# Patient Record
Sex: MALE | Race: WHITE | NOT HISPANIC OR LATINO
[De-identification: names, ages, dates, MRNs, and addresses within clinical notes are randomized per-mention and may not be internally consistent; named-entity substitution may affect disease eponyms.]

---

## 2017-03-09 ENCOUNTER — APPOINTMENT (OUTPATIENT)
Dept: OPHTHALMOLOGY | Facility: CLINIC | Age: 77
End: 2017-03-09

## 2017-04-20 ENCOUNTER — APPOINTMENT (OUTPATIENT)
Dept: OPHTHALMOLOGY | Facility: CLINIC | Age: 77
End: 2017-04-20

## 2018-01-05 ENCOUNTER — APPOINTMENT (OUTPATIENT)
Dept: OPHTHALMOLOGY | Facility: CLINIC | Age: 78
End: 2018-01-05
Payer: MEDICARE

## 2018-01-05 PROCEDURE — 92014 COMPRE OPH EXAM EST PT 1/>: CPT

## 2018-04-19 ENCOUNTER — APPOINTMENT (OUTPATIENT)
Dept: OPHTHALMOLOGY | Facility: CLINIC | Age: 78
End: 2018-04-19
Payer: MEDICARE

## 2018-04-19 PROCEDURE — 92014 COMPRE OPH EXAM EST PT 1/>: CPT

## 2018-10-18 ENCOUNTER — APPOINTMENT (OUTPATIENT)
Dept: OPHTHALMOLOGY | Facility: CLINIC | Age: 78
End: 2018-10-18
Payer: MEDICARE

## 2018-10-18 PROCEDURE — 92226: CPT | Mod: 50

## 2018-10-18 PROCEDURE — 92014 COMPRE OPH EXAM EST PT 1/>: CPT

## 2019-03-01 ENCOUNTER — RESULT REVIEW (OUTPATIENT)
Age: 79
End: 2019-03-01

## 2019-03-01 ENCOUNTER — APPOINTMENT (OUTPATIENT)
Dept: HEMATOLOGY ONCOLOGY | Facility: CLINIC | Age: 79
End: 2019-03-01
Payer: MEDICARE

## 2019-03-01 VITALS
RESPIRATION RATE: 20 BRPM | HEART RATE: 63 BPM | HEIGHT: 73.03 IN | OXYGEN SATURATION: 95 % | SYSTOLIC BLOOD PRESSURE: 134 MMHG | BODY MASS INDEX: 27.3 KG/M2 | WEIGHT: 206 LBS | DIASTOLIC BLOOD PRESSURE: 68 MMHG | TEMPERATURE: 98 F

## 2019-03-01 DIAGNOSIS — Z80.0 FAMILY HISTORY OF MALIGNANT NEOPLASM OF DIGESTIVE ORGANS: ICD-10-CM

## 2019-03-01 DIAGNOSIS — Z87.891 PERSONAL HISTORY OF NICOTINE DEPENDENCE: ICD-10-CM

## 2019-03-01 DIAGNOSIS — Z85.46 PERSONAL HISTORY OF MALIGNANT NEOPLASM OF PROSTATE: ICD-10-CM

## 2019-03-01 PROCEDURE — 99205 OFFICE O/P NEW HI 60 MIN: CPT

## 2019-03-01 NOTE — ASSESSMENT
[FreeTextEntry1] : Right LE DVT\par Likely unprovoked\par He reports that he had an extensive occlusive right LE DVT- started having symptoms again since stopping xarelto\par \par Advised to resume xarelto and complete 6 months\par Thrombophilia work up sent today\par Reports uptodate with cancer screening\par \par Follow up in a few weeks. No labs on follow up

## 2019-03-01 NOTE — PHYSICAL EXAM
[Ambulatory and capable of all self care but unable to carry out any work activities] : Status 2- Ambulatory and capable of all self care but unable to carry out any work activities. Up and about more than 50% of waking hours [Normal] : no peripheral adenopathy appreciated

## 2019-03-01 NOTE — HISTORY OF PRESENT ILLNESS
[de-identified] : Mr Young is a very pleasant 78 year old gentleman with HTN, HLD here to discuss management for her right LE DVT\par \par Around beginning of October- he felt as sharp pain righjt calf, no swelling, discomfort\par He went to a local physician - who did ultrasound- sent to [facility]- where he had repeat ultrasoud and was placed on xarelto \par Tolerated well\par \par He had a repeat ultrasound 1/31/19 which showed partially occlusive DVT of the right femoral and polpiteal veins with mild reduction of the venous return\par He was advised to stop xarelto 2/12/19\par \par He feels that past few days - he has started having the same pain right calf pain. No swelling\par \par \par He is retired. He goes to a [employer] program 5 days a week, swims 3 days a week\par He always sits cross legged, when not on a recliner\par Denies any long distance travel, surgeries, immobility prior to the event\par \par Has ho prostate cancer had radical prostatectomy [facility] ~ 5 years ago (9/2014).\par No DVT post surgery\par \par No personal or family ho DVT\par \par Colonoscopy (within last year) - Dr Lucien Aguillon - was normal

## 2019-03-01 NOTE — CONSULT LETTER
[Dear  ___] : Dear  [unfilled], [Consult Letter:] : I had the pleasure of evaluating your patient, [unfilled]. [Please see my note below.] : Please see my note below. [Consult Closing:] : Thank you very much for allowing me to participate in the care of this patient.  If you have any questions, please do not hesitate to contact me. [Sincerely,] : Sincerely, [FreeTextEntry3] : Rancho Jack MD, MPH\par Attending Physician\par Hematology Oncology\par [facility] [place]\par [facility]\par

## 2019-03-04 ENCOUNTER — RX RENEWAL (OUTPATIENT)
Age: 79
End: 2019-03-04

## 2019-03-15 ENCOUNTER — RECORD ABSTRACTING (OUTPATIENT)
Age: 79
End: 2019-03-15

## 2019-03-15 DIAGNOSIS — Z82.3 FAMILY HISTORY OF STROKE: ICD-10-CM

## 2019-03-15 DIAGNOSIS — Z87.19 PERSONAL HISTORY OF OTHER DISEASES OF THE DIGESTIVE SYSTEM: ICD-10-CM

## 2019-03-15 DIAGNOSIS — F10.21 ALCOHOL DEPENDENCE, IN REMISSION: ICD-10-CM

## 2019-03-20 ENCOUNTER — APPOINTMENT (OUTPATIENT)
Dept: HEMATOLOGY ONCOLOGY | Facility: CLINIC | Age: 79
End: 2019-03-20
Payer: MEDICARE

## 2019-03-20 VITALS
WEIGHT: 208 LBS | TEMPERATURE: 97.6 F | BODY MASS INDEX: 27.57 KG/M2 | SYSTOLIC BLOOD PRESSURE: 145 MMHG | RESPIRATION RATE: 18 BRPM | HEIGHT: 73.03 IN | HEART RATE: 65 BPM | DIASTOLIC BLOOD PRESSURE: 64 MMHG | OXYGEN SATURATION: 96 %

## 2019-03-20 PROCEDURE — 99214 OFFICE O/P EST MOD 30 MIN: CPT

## 2019-03-20 NOTE — RESULTS/DATA
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 However, the\par actual risk may be modified by synergistic interaction with other factors.\par Correlation with this patients clinical condition and/or with results of other\par relevant tests, and genetic counseling is suggested.\par \par Factor V leiden mutation, APS, protein S, AT3 def- negative\par

## 2019-03-20 NOTE — HISTORY OF PRESENT ILLNESS
[de-identified] : Mr Young is a very pleasant 78 year old gentleman with HTN, HLD here to discuss management for her right LE DVT\par \par Around beginning of October- he felt as sharp pain righjt calf, no swelling, discomfort\par He went to a local physician - who did ultrasound- sent to [facility]- where he had repeat ultrasoud and was placed on xarelto \par Tolerated well\par \par He had a repeat ultrasound 1/31/19 which showed partially occlusive DVT of the right femoral and polpiteal veins with mild reduction of the venous return\par He was advised to stop xarelto 2/12/19\par \par He feels that past few days - he has started having the same pain right calf pain. No swelling\par \par \par He is retired. 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

## 2019-03-20 NOTE — ASSESSMENT
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 CBC, CMP, D-Dimer\par

## 2019-03-20 NOTE — CONSULT LETTER
[Dear  ___] : Dear  [unfilled], [Please see my note below.] : Please see my note below. [Consult Closing:] : Thank you very much for allowing me to participate in the care of this patient.  If you have any questions, please do not hesitate to contact me. [Sincerely,] : Sincerely, [Courtesy Letter:] : I had the pleasure of seeing your patient, [unfilled], in my office today. [DrDariela  ___] : Dr. PRETTY [FreeTextEntry3] : Rancho Jack MD, MPH\par Attending Physician\par Hematology Oncology\par [facility] [place]\par [facility]\par

## 2019-04-19 ENCOUNTER — APPOINTMENT (OUTPATIENT)
Dept: OPHTHALMOLOGY | Facility: CLINIC | Age: 79
End: 2019-04-19
Payer: MEDICARE

## 2019-04-19 PROCEDURE — 92014 COMPRE OPH EXAM EST PT 1/>: CPT

## 2019-05-01 ENCOUNTER — APPOINTMENT (OUTPATIENT)
Dept: CARDIOLOGY | Facility: CLINIC | Age: 79
End: 2019-05-01
Payer: MEDICARE

## 2019-05-01 VITALS
WEIGHT: 205 LBS | DIASTOLIC BLOOD PRESSURE: 60 MMHG | HEART RATE: 60 BPM | HEIGHT: 73 IN | BODY MASS INDEX: 27.17 KG/M2 | SYSTOLIC BLOOD PRESSURE: 130 MMHG

## 2019-05-01 PROCEDURE — 99213 OFFICE O/P EST LOW 20 MIN: CPT | Mod: 25

## 2019-05-01 PROCEDURE — 93351 STRESS TTE COMPLETE: CPT

## 2019-05-01 RX ORDER — MIRTAZAPINE 7.5 MG/1
7.5 TABLET, FILM COATED ORAL
Refills: 0 | Status: DISCONTINUED | COMMUNITY
End: 2019-05-01

## 2019-05-01 RX ORDER — QUETIAPINE FUMARATE 50 MG/1
50 TABLET ORAL
Refills: 0 | Status: DISCONTINUED | COMMUNITY
End: 2019-05-01

## 2019-05-01 RX ORDER — QUETIAPINE 25 MG/1
25 TABLET, FILM COATED ORAL
Refills: 0 | Status: DISCONTINUED | COMMUNITY
End: 2019-05-01

## 2019-05-01 NOTE — REASON FOR VISIT
[FreeTextEntry1] : The patient comes for a cardiology followup and stress echocardiography. He complains of symptoms of shortness of breath on moderate exertion over the past several weeks. The patient has just gotten over a long bout with cough and phlegm production. During this time his regular exercise program has been curtailed.There have been no cardiac symptoms of chest pain or discomfort no palpitations dizziness or lightheadedness.

## 2019-05-01 NOTE — DISCUSSION/SUMMARY
[FreeTextEntry1] : Today's stress echo findings revealed normal LV function at rest and post exercise with no evidence of exercise-induced myocardial ischemia or significant arrhythmias. The exercise capacity is somewhat limited. The patient has difficulty completing Francis stage II. The findings are similar to last year's study but the exercise capacity is reduced compared to 2 years ago. I do not feel that the symptom was explainable on a cardiac basis. I felt the patient should gradually get back into his program of exercise and if symptoms persist to consider pulmonary evaluation. Current medications will be continued. The patient is at the end of his prescribed period of anticoagulation following a DVT study several months ago. I asked him to check with his hematologist as to whether he should be transitioned to aspirin. 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 Today's findings were discussed with the patient and his wife.

## 2019-05-01 NOTE — PHYSICAL EXAM
[General Appearance - Well Developed] : well developed [Normal Appearance] : normal appearance [Well Groomed] : well groomed [No Deformities] : no deformities [General Appearance - Well Nourished] : well nourished [General Appearance - In No Acute Distress] : no acute distress [Normal Conjunctiva] : the conjunctiva exhibited no abnormalities [Eyelids - No Xanthelasma] : the eyelids demonstrated no xanthelasmas [Normal Oral Mucosa] : normal oral mucosa [No Oral Pallor] : no oral pallor [No Oral Cyanosis] : no oral cyanosis [Normal Jugular Venous A Waves Present] : normal jugular venous A waves present [Normal Jugular Venous V Waves Present] : normal jugular venous V waves present [No Jugular Venous Gaspar A Waves] : no jugular venous gaspar A waves [Exaggerated Use Of Accessory Muscles For Inspiration] : no accessory muscle use [Respiration, Rhythm And Depth] : normal respiratory rhythm and effort [Auscultation Breath Sounds / Voice Sounds] : lungs were clear to auscultation bilaterally [Heart Rate And Rhythm] : heart rate and rhythm were normal [Heart Sounds] : normal S1 and S2 [Murmurs] : no murmurs present [Abdomen Soft] : soft [Abdomen Tenderness] : non-tender [Abdomen Mass (___ Cm)] : no abdominal mass palpated [Abnormal Walk] : normal gait [Gait - Sufficient For Exercise Testing] : the gait was sufficient for exercise testing [Nail Clubbing] : no clubbing of the fingernails [Cyanosis, Localized] : no localized cyanosis [Petechial Hemorrhages (___cm)] : no petechial hemorrhages [Skin Color & Pigmentation] : normal skin color and pigmentation [] : no rash [No Venous Stasis] : no venous stasis [Skin Lesions] : no skin lesions [No Skin Ulcers] : no skin ulcer [No Xanthoma] : no  xanthoma was observed [Oriented To Time, Place, And Person] : oriented to person, place, and time [Affect] : the affect was normal [Mood] : the mood was normal [No Anxiety] : not feeling anxious [FreeTextEntry1] : The patient was treated recently for severe depression including ECT. The symptoms of depression have cleared.

## 2019-05-02 RX ORDER — ATORVASTATIN CALCIUM 20 MG/1
20 TABLET, FILM COATED ORAL
Refills: 0 | Status: DISCONTINUED | COMMUNITY
End: 2019-05-02

## 2019-05-22 ENCOUNTER — APPOINTMENT (OUTPATIENT)
Dept: CARDIOLOGY | Facility: CLINIC | Age: 79
End: 2019-05-22

## 2019-05-24 ENCOUNTER — RESULT REVIEW (OUTPATIENT)
Age: 79
End: 2019-05-24

## 2019-05-24 ENCOUNTER — APPOINTMENT (OUTPATIENT)
Dept: HEMATOLOGY ONCOLOGY | Facility: CLINIC | Age: 79
End: 2019-05-24
Payer: MEDICARE

## 2019-05-24 VITALS
TEMPERATURE: 98.3 F | DIASTOLIC BLOOD PRESSURE: 56 MMHG | RESPIRATION RATE: 18 BRPM | HEIGHT: 73.03 IN | WEIGHT: 212 LBS | HEART RATE: 64 BPM | OXYGEN SATURATION: 96 % | BODY MASS INDEX: 28.1 KG/M2 | SYSTOLIC BLOOD PRESSURE: 138 MMHG

## 2019-05-24 PROCEDURE — 99215 OFFICE O/P EST HI 40 MIN: CPT

## 2019-05-24 NOTE — HISTORY OF PRESENT ILLNESS
[de-identified] : He is seen today for follow up\par \par STopped xarelto end of April after completing 6 months \par Around mid March he started having "very mild dyspnea on exertion"\par He saw his cardiologist- who cleared him\par Subsequently saw Dr Al Padgett ([facility])\par  [de-identified] : Mr Young is a very pleasant 78 year old gentleman with HTN, HLD here to discuss management for her right LE DVT\par \par Around beginning of October- he felt as sharp pain righjt calf, no swelling, discomfort\par He went to a local physician - who did ultrasound- sent to [facility]- where he had repeat ultrasoud and was placed on xarelto \par Tolerated well\par \par He had a repeat ultrasound 1/31/19 which showed partially occlusive DVT of the right femoral and polpiteal veins with mild reduction of the venous return\par He was advised to stop xarelto 2/12/19\par \par He feels that past few days - he has started having the same pain right calf pain. No swelling\par \par \par He is retired. He goes to a [employer] program 5 days a week, swims 3 days a week\par He always sits cross legged, when not on a recliner\par Denies any long distance travel, surgeries, immobility prior to the event\par \par Has ho prostate cancer had radical prostatectomy [facility] ~ 5 years ago (9/2014).\par No DVT post surgery\par \par No personal or family ho DVT\par \par Colonoscopy (within last year) - Dr Lucien Aguillon - was normal \par \par \par Prothrombin gene mutation:\par HETEROZYGOUS\par INTERPRETATION:\par This patient is heterozygous for the Prothrombin [id number] mutation, and therefore\par at an increased risk(up to 3 fold) for thrombotic complications. However, the\par actual risk may be modified by synergistic interaction with other factors.\par Correlation with this patients clinical condition and/or with results of other\par relevant tests, and genetic counseling is suggested.\par \par Factor V leiden mutation, APS, protein S, AT3 def- negative\par

## 2019-05-24 NOTE — ASSESSMENT
[FreeTextEntry1] : SOB on exertion\par Elevated D DImer\par CT Angiogram and LE doppler\par \par Right LE DVT\par Likely unprovoked\par He reports that he had an extensive occlusive right LE DVT- started having symptoms again since stopping xarelto\par \par Work up shows heterozygous prothrombin gene mutation\par Completed 6 months of AC (xarelto) in April 2019\par Also advised to inform blood relative about the findings and have them evaluated by their PCPs\par Keep uptodate with cancer screening\par \par Follow up in 3 months. CBC, CMP, D-Dimer\par

## 2019-05-24 NOTE — CONSULT LETTER
[Dear  ___] : Dear  [unfilled], [Courtesy Letter:] : I had the pleasure of seeing your patient, [unfilled], in my office today. [Please see my note below.] : Please see my note below. [Consult Closing:] : Thank you very much for allowing me to participate in the care of this patient.  If you have any questions, please do not hesitate to contact me. [Sincerely,] : Sincerely, [DrDariela  ___] : Dr. PRETTY [FreeTextEntry3] : Rancho Jack MD, MPH\par Attending Physician\par Hematology Oncology\par [facility] [place]\par [facility]\par

## 2019-06-05 ENCOUNTER — APPOINTMENT (OUTPATIENT)
Dept: HEMATOLOGY ONCOLOGY | Facility: CLINIC | Age: 79
End: 2019-06-05
Payer: MEDICARE

## 2019-06-05 ENCOUNTER — RESULT REVIEW (OUTPATIENT)
Age: 79
End: 2019-06-05

## 2019-06-05 VITALS
DIASTOLIC BLOOD PRESSURE: 55 MMHG | SYSTOLIC BLOOD PRESSURE: 130 MMHG | HEIGHT: 73.03 IN | HEART RATE: 60 BPM | RESPIRATION RATE: 20 BRPM | TEMPERATURE: 98.1 F | OXYGEN SATURATION: 96 %

## 2019-06-05 PROCEDURE — 99499A: CUSTOM | Mod: NC

## 2019-06-18 ENCOUNTER — APPOINTMENT (OUTPATIENT)
Dept: GASTROENTEROLOGY | Facility: CLINIC | Age: 79
End: 2019-06-18
Payer: MEDICARE

## 2019-06-18 VITALS
HEART RATE: 65 BPM | WEIGHT: 206 LBS | HEIGHT: 73 IN | SYSTOLIC BLOOD PRESSURE: 124 MMHG | DIASTOLIC BLOOD PRESSURE: 56 MMHG | BODY MASS INDEX: 27.3 KG/M2

## 2019-06-18 PROCEDURE — 99204 OFFICE O/P NEW MOD 45 MIN: CPT

## 2019-06-18 NOTE — ASSESSMENT
[FreeTextEntry1] : Will plan on an upper endoscopy for iron deficiency anemia.  Explained risks/benefits/alternatives including not limited to bleeding, infection, perforation, missed lesions, anesthesia complications.  Patient understands and agrees, all questions answered.\par \par If his EGD is negative, will need a small bowel video capsule study.\par \par Thank you for referring Mr. ZIMMER.  Please do not hesitate to call to further discuss his/her care.\par \par Note faxed to requesting MD.\par \par

## 2019-06-18 NOTE — HISTORY OF PRESENT ILLNESS
[FreeTextEntry1] : Mr. Young is a pleasant 79M h/o HTN, HLD, RLE DVT diagnosed 10/18 s/p Xarelto until April 2019 seen at the request of Dr. Jack for iron deficiency anemia.  Recently was worked up for shortness of breath, saw both a cardiologist and pulmonologist, no cardiac or pulmonary abnormalities found to explain symptoms, during his workup he was found to have iron deficiency anemia.\par \par He has a normal brown BM daily, no blood, no black stool.\par \par Had a colonoscopy with Dr. Lucien Aguillon within the past year, states he was told he had a good prep, no polyps or masses found.  Has never had an EGD or small bowel evaluation.\par \juliana Is receiving IV iron infusions via Dr. Jack.\par \par No heartburn, regurgitation, dysphagia/odynohpagia, weight loss.  No other complaints.

## 2019-06-18 NOTE — PHYSICAL EXAM
[Sclera] : the sclera and conjunctiva were normal [General Appearance - Alert] : alert [General Appearance - In No Acute Distress] : in no acute distress [] : no respiratory distress [Outer Ear] : the ears and nose were normal in appearance [Apical Impulse] : the apical impulse was normal [Abdomen Tenderness] : non-tender [Abnormal Walk] : normal gait [Abdomen Soft] : soft [Skin Color & Pigmentation] : normal skin color and pigmentation [Cranial Nerves] : cranial nerves 2-12 were intact [Oriented To Time, Place, And Person] : oriented to person, place, and time

## 2019-06-25 ENCOUNTER — RESULT REVIEW (OUTPATIENT)
Age: 79
End: 2019-06-25

## 2019-06-26 ENCOUNTER — APPOINTMENT (OUTPATIENT)
Dept: GASTROENTEROLOGY | Facility: HOSPITAL | Age: 79
End: 2019-06-26

## 2019-06-27 ENCOUNTER — APPOINTMENT (OUTPATIENT)
Dept: GASTROENTEROLOGY | Facility: CLINIC | Age: 79
End: 2019-06-27

## 2019-06-28 ENCOUNTER — TRANSCRIPTION ENCOUNTER (OUTPATIENT)
Age: 79
End: 2019-06-28

## 2019-07-16 ENCOUNTER — APPOINTMENT (OUTPATIENT)
Dept: GASTROENTEROLOGY | Facility: HOSPITAL | Age: 79
End: 2019-07-16

## 2019-07-18 ENCOUNTER — APPOINTMENT (OUTPATIENT)
Dept: HEMATOLOGY ONCOLOGY | Facility: CLINIC | Age: 79
End: 2019-07-18
Payer: MEDICARE

## 2019-07-18 ENCOUNTER — RESULT REVIEW (OUTPATIENT)
Age: 79
End: 2019-07-18

## 2019-07-18 VITALS
WEIGHT: 204 LBS | TEMPERATURE: 95 F | HEIGHT: 73 IN | SYSTOLIC BLOOD PRESSURE: 151 MMHG | DIASTOLIC BLOOD PRESSURE: 69 MMHG | OXYGEN SATURATION: 95 % | RESPIRATION RATE: 16 BRPM | BODY MASS INDEX: 27.04 KG/M2 | HEART RATE: 55 BPM

## 2019-07-18 PROCEDURE — 99214 OFFICE O/P EST MOD 30 MIN: CPT

## 2019-07-18 NOTE — HISTORY OF PRESENT ILLNESS
[de-identified] : Mr Young is a very pleasant 78 year old gentleman with HTN, HLD here to discuss management for her right LE DVT\par \par Around beginning of October- he felt as sharp pain righjt calf, no swelling, discomfort\par He went to a local physician - who did ultrasound- sent to [facility]- where he had repeat ultrasoud and was placed on xarelto \par Tolerated well\par \par He had a repeat ultrasound 1/31/19 which showed partially occlusive DVT of the right femoral and polpiteal veins with mild reduction of the venous return\par He was advised to stop xarelto 2/12/19\par \par He feels that past few days - he has started having the same pain right calf pain. No swelling\par \par \par He is retired. 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, swims 3 days a week\par He always sits cross legged, when not on a recliner\par Denies any long distance travel, surgeries, immobility prior to the event\par \par Has ho prostate cancer had radical prostatectomy [facility] ~ 5 years ago (9/2014).\par No DVT post surgery\par \par No personal or family ho DVT\par \par Colonoscopy (within last year) - Dr Lucien Aguillon - was normal \par \par \par Prothrombin gene mutation:\par HETEROZYGOUS\par INTERPRETATION:\par This patient is heterozygous for the Prothrombin [id number] mutation, and therefore\par at an increased risk(up to 3 fold) for thrombotic complications. However, the\par actual risk may be modified by synergistic interaction with other factors.\par Correlation with this patients clinical condition and/or with results of other\par relevant tests, and genetic counseling is suggested.\par \par Factor V leiden mutation, APS, protein S, AT3 def- negative\par  [de-identified] : He is seen today for follow up\par \par He had worsening sob on exertion- work up showed ISMAEL and drop in Hgb\par s/p IV injectafer 750 mg x 1\par Feels significantly better, no further sob on exertion\par \par Hgb (7/3/19 with Dr Aguillon - 13\par \par Also saw Dr Martinez s/p EGD\par EGD:\par 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 Gastric polyp s/p biopsy\par 3. Irregular Z-line s/p biopsy\par

## 2019-07-18 NOTE — CONSULT LETTER
[Dear  ___] : Dear  [unfilled], [Courtesy Letter:] : I had the pleasure of seeing your patient, [unfilled], in my office today. [Please see my note below.] : Please see my note below. [Consult Closing:] : Thank you very much for allowing me to participate in the care of this patient.  If you have any questions, please do not hesitate to contact me. [Sincerely,] : Sincerely, [DrDariela  ___] : Dr. PRETTY [FreeTextEntry3] : Rancho Jack MD, MPH\par Attending Physician\par Hematology Oncology\par [facility] [place]\par [facility]\par

## 2019-07-18 NOTE — ASSESSMENT
[FreeTextEntry1] : Right LE DVT\par Likely unprovoked\par He reports that he had an extensive occlusive right LE DVT- started having symptoms again since stopping xarelto\par Work up shows heterozygous prothrombin gene mutation\par Completed 6 months of AC (xarelto) in April 2019\par Had rising D Dimer- CT angio negative for PE, ultrasound doppler showed old DVT\par D Dimer today pending\par Monitor symptoms for now\par May need long term anticoagulation in event for another DVT\par Also advised to inform blood relative about the findings and have them evaluated by their PCPs\par Keep uptodate with cancer screening\par \par Iron deficiency anemia\par Hgb and symptoms improved with IV Injectafer x 1\par Had EGD and capsule endoscopy with Dr Martinez\par EGD showed 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 CBC, CMP, D-Dimer, ferritin\par

## 2019-09-06 ENCOUNTER — APPOINTMENT (OUTPATIENT)
Dept: HEMATOLOGY ONCOLOGY | Facility: CLINIC | Age: 79
End: 2019-09-06
Payer: MEDICARE

## 2019-09-06 ENCOUNTER — RESULT REVIEW (OUTPATIENT)
Age: 79
End: 2019-09-06

## 2019-09-06 VITALS
SYSTOLIC BLOOD PRESSURE: 143 MMHG | BODY MASS INDEX: 27.04 KG/M2 | TEMPERATURE: 98.5 F | OXYGEN SATURATION: 94 % | HEART RATE: 58 BPM | DIASTOLIC BLOOD PRESSURE: 65 MMHG | WEIGHT: 204 LBS | RESPIRATION RATE: 20 BRPM | HEIGHT: 72.99 IN

## 2019-09-06 PROCEDURE — 99215 OFFICE O/P EST HI 40 MIN: CPT

## 2019-09-06 NOTE — ASSESSMENT
[FreeTextEntry1] : Right LE DVT\par Likely unprovoked\par He reports that he had an extensive occlusive right LE DVT- started having symptoms again since stopping xarelto\par Work up shows heterozygous prothrombin gene mutation\par Completed 6 months of AC (xarelto) in April 2019\par Had rising D Dimer- CT angio negative for PE, ultrasound doppler showed old DVT\par D Dimer today pending\par Monitor symptoms for now\par May need long term anticoagulation in event for another DVT\par Also advised to inform blood relative about the findings and have them evaluated by their PCPs\par Keep uptodate with cancer screening\par Consider prophylactic NOACs if D DImer continues to be elevated\par \par Iron deficiency anemia\par Feels better\par Labs today reviewed and discussed\par Ferritin pending. 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 CBC, CMP, D-Dimer, ferritin\par

## 2019-09-06 NOTE — CONSULT LETTER
[Dear  ___] : Dear  [unfilled], [Courtesy Letter:] : I had the pleasure of seeing your patient, [unfilled], in my office today. [Please see my note below.] : Please see my note below. [Consult Closing:] : Thank you very much for allowing me to participate in the care of this patient.  If you have any questions, please do not hesitate to contact me. [Sincerely,] : Sincerely, [DrDariela  ___] : Dr. PRETTY [FreeTextEntry3] : Rancho Jack MD, MPH\par Attending Physician\par Hematology Oncology\par [facility] [place]\par [facility]\par

## 2019-09-06 NOTE — HISTORY OF PRESENT ILLNESS
[de-identified] : Mr Young is a very pleasant 78 year old gentleman with HTN, HLD here to discuss management for her right LE DVT\par \par Around beginning of October- he felt as sharp pain righjt calf, no swelling, discomfort\par He went to a local physician - who did ultrasound- sent to [facility]- where he had repeat ultrasoud and was placed on xarelto \par Tolerated well\par \par He had a repeat ultrasound 1/31/19 which showed partially occlusive DVT of the right femoral and polpiteal veins with mild reduction of the venous return\par He was advised to stop xarelto 2/12/19\par \par He feels that past few days - he has started having the same pain right calf pain. No swelling\par \par \par He is retired. He goes to a [employer] program 5 days a week, swims 3 days a week\par He always sits cross legged, when not on a recliner\par Denies any long distance travel, surgeries, immobility prior to the event\par \par Has ho prostate cancer had radical prostatectomy [facility] ~ 5 years ago (9/2014).\par No DVT post surgery\par \par No personal or family ho DVT\par \par Colonoscopy (within last year) - Dr Lucien Aguillon - was normal \par \par \par Prothrombin gene mutation:\par HETEROZYGOUS\par INTERPRETATION:\par This patient is heterozygous for the Prothrombin [id number] mutation, and therefore\par at an increased risk(up to 3 fold) for thrombotic complications. However, the\par actual risk may be modified by synergistic interaction with other factors.\par Correlation with this patients clinical condition and/or with results of other\par relevant tests, and genetic counseling is suggested.\par \par Factor V leiden mutation, APS, protein S, AT3 def- negative\par \par Also saw Dr Martinez s/p EGD\par EGD:\par 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 Gastric polyp s/p biopsy\par 3. Irregular Z-line s/p biopsy\par  [de-identified] : He is seen today for follow up\par \par Feels good overall\par He is s/p IV injectafer on 8/7; 8/16\par \par Also has anal pain, saw GI (rectal specialist) - had MRI scheduled next week\par

## 2019-10-11 ENCOUNTER — APPOINTMENT (OUTPATIENT)
Dept: HEMATOLOGY ONCOLOGY | Facility: CLINIC | Age: 79
End: 2019-10-11

## 2019-10-18 ENCOUNTER — APPOINTMENT (OUTPATIENT)
Dept: OPHTHALMOLOGY | Facility: CLINIC | Age: 79
End: 2019-10-18
Payer: MEDICARE

## 2019-10-18 ENCOUNTER — NON-APPOINTMENT (OUTPATIENT)
Age: 79
End: 2019-10-18

## 2019-10-18 PROCEDURE — 92014 COMPRE OPH EXAM EST PT 1/>: CPT

## 2019-11-06 ENCOUNTER — APPOINTMENT (OUTPATIENT)
Dept: HEMATOLOGY ONCOLOGY | Facility: CLINIC | Age: 79
End: 2019-11-06
Payer: MEDICARE

## 2019-11-06 VITALS
SYSTOLIC BLOOD PRESSURE: 125 MMHG | WEIGHT: 208.1 LBS | TEMPERATURE: 97.5 F | OXYGEN SATURATION: 94 % | BODY MASS INDEX: 27.58 KG/M2 | HEART RATE: 63 BPM | RESPIRATION RATE: 16 BRPM | HEIGHT: 72.95 IN | DIASTOLIC BLOOD PRESSURE: 66 MMHG

## 2019-11-06 PROCEDURE — 99214 OFFICE O/P EST MOD 30 MIN: CPT

## 2019-11-06 NOTE — CONSULT LETTER
[Dear  ___] : Dear  [unfilled], [Courtesy Letter:] : I had the pleasure of seeing your patient, [unfilled], in my office today. [Please see my note below.] : Please see my note below. [Consult Closing:] : Thank you very much for allowing me to participate in the care of this patient.  If you have any questions, please do not hesitate to contact me. [Sincerely,] : Sincerely, [DrDariela  ___] : Dr. PRETTY [FreeTextEntry3] : Rancho Jack MD, MPH\par Attending Physician\par Hematology Oncology\par [facility] [place]\par [facility]\par

## 2019-11-06 NOTE — HISTORY OF PRESENT ILLNESS
[de-identified] : Mr Young is a very pleasant 78 year old gentleman with HTN, HLD here to discuss management for her right LE DVT\par \par Around beginning of October- he felt as sharp pain righjt calf, no swelling, discomfort\par He went to a local physician - who did ultrasound- sent to [facility]- where he had repeat ultrasoud and was placed on xarelto \par Tolerated well\par \par He had a repeat ultrasound 1/31/19 which showed partially occlusive DVT of the right femoral and polpiteal veins with mild reduction of the venous return\par He was advised to stop xarelto 2/12/19\par \par He feels that past few days - he has started having the same pain right calf pain. No swelling\par \par \par He is retired. 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 However, the\par actual risk may be modified by synergistic interaction with other factors.\par Correlation with this patients clinical condition and/or with results of other\par relevant tests, and genetic counseling is suggested.\par \par Factor V leiden mutation, APS, protein S, AT3 def- negative\par \par Also saw Dr Martinez s/p EGD\par EGD:\par 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 Gastric polyp s/p biopsy\par 3. Irregular Z-line s/p biopsy\par  [de-identified] : He is seen today for follow up\par \par Feels good overall\par He reports occasional pain right calf - he admits that it could be his mind too- as he keeps worrying about blood clots since his past episode\par \par

## 2019-11-13 ENCOUNTER — NON-APPOINTMENT (OUTPATIENT)
Age: 79
End: 2019-11-13

## 2019-11-13 ENCOUNTER — APPOINTMENT (OUTPATIENT)
Dept: CARDIOLOGY | Facility: CLINIC | Age: 79
End: 2019-11-13
Payer: MEDICARE

## 2019-11-13 VITALS
WEIGHT: 200 LBS | SYSTOLIC BLOOD PRESSURE: 122 MMHG | HEART RATE: 64 BPM | BODY MASS INDEX: 26.51 KG/M2 | HEIGHT: 72.95 IN | DIASTOLIC BLOOD PRESSURE: 64 MMHG

## 2019-11-13 DIAGNOSIS — I25.10 ATHEROSCLEROTIC HEART DISEASE OF NATIVE CORONARY ARTERY W/OUT ANGINA PECTORIS: ICD-10-CM

## 2019-11-13 PROCEDURE — 93000 ELECTROCARDIOGRAM COMPLETE: CPT

## 2019-11-13 PROCEDURE — 99214 OFFICE O/P EST MOD 30 MIN: CPT

## 2019-11-13 RX ORDER — ATORVASTATIN CALCIUM 10 MG/1
10 TABLET, FILM COATED ORAL
Qty: 90 | Refills: 1 | Status: DISCONTINUED | COMMUNITY
Start: 2018-04-03 | End: 2019-11-13

## 2019-11-13 RX ORDER — ASPIRIN 81 MG/1
81 TABLET ORAL DAILY
Refills: 0 | Status: DISCONTINUED | COMMUNITY
End: 2019-11-13

## 2019-11-13 NOTE — DISCUSSION/SUMMARY
[FreeTextEntry1] : Please note the patient's cardiac examination today. In retrospect symptoms of shortness of breath with exertion are entirely attributable to severe anemia. In turn I believe this was most likely related to gradual upper GI blood loss. This appears to be resolved. I have asked the patient to supplement his diet with green vegetables and to be cognizant of any change in bowel habits etc. Cardiac medications will be continued. I also asked the patient to check with Dr. Martinez as to whether aspirin therapy it is okay with him. The patient's cardiac medications will be continued.

## 2019-11-13 NOTE — REASON FOR VISIT
[FreeTextEntry1] : The patient at the time of her last visit was complaining of shortness of breath on exertion. We have ruled out a cardiac etiology. Eventually he was found to have severe iron deficiency anemia. He was referred to hematology who has prescribed several iron infusions. This has resulted in complete reversal of the anemia and resolution of symptoms. The patient also has been found to have certain mutations which susceptibility to blood clotting. Recall that he did have treatment for DVT a year ago. When the anemia was diagnosed the patient had been taken off anticoagulation in favor of aspirin 81 mg daily. This was felt to be okay with the patient's hematologist. Patient has had no obvious GI blood loss and change in bowel habits or stool discoloration. An upper endoscopy performed during the course of his evaluation revealed a edema ectasia with slight bleeding. I would suspect that this was the cause of the iron deficiency anemia. There was no previous symptoms or knowledge of peptic ulcer disease.

## 2019-11-13 NOTE — PHYSICAL EXAM
[General Appearance - Well Developed] : well developed [Normal Appearance] : normal appearance [General Appearance - Well Nourished] : well nourished [Well Groomed] : well groomed [No Deformities] : no deformities [General Appearance - In No Acute Distress] : no acute distress [Eyelids - No Xanthelasma] : the eyelids demonstrated no xanthelasmas [Normal Conjunctiva] : the conjunctiva exhibited no abnormalities [Normal Oral Mucosa] : normal oral mucosa [No Oral Pallor] : no oral pallor [No Oral Cyanosis] : no oral cyanosis [Normal Jugular Venous A Waves Present] : normal jugular venous A waves present [Normal Jugular Venous V Waves Present] : normal jugular venous V waves present [No Jugular Venous Gaspar A Waves] : no jugular venous gaspar A waves [Respiration, Rhythm And Depth] : normal respiratory rhythm and effort [Exaggerated Use Of Accessory Muscles For Inspiration] : no accessory muscle use [Auscultation Breath Sounds / Voice Sounds] : lungs were clear to auscultation bilaterally [Heart Sounds] : normal S1 and S2 [Heart Rate And Rhythm] : heart rate and rhythm were normal [Abdomen Soft] : soft [Murmurs] : no murmurs present [Abdomen Mass (___ Cm)] : no abdominal mass palpated [Abdomen Tenderness] : non-tender [Abnormal Walk] : normal gait [Gait - Sufficient For Exercise Testing] : the gait was sufficient for exercise testing [Cyanosis, Localized] : no localized cyanosis [Nail Clubbing] : no clubbing of the fingernails [Skin Color & Pigmentation] : normal skin color and pigmentation [Petechial Hemorrhages (___cm)] : no petechial hemorrhages [No Venous Stasis] : no venous stasis [] : no rash [No Skin Ulcers] : no skin ulcer [Skin Lesions] : no skin lesions [No Xanthoma] : no  xanthoma was observed [Oriented To Time, Place, And Person] : oriented to person, place, and time [Affect] : the affect was normal [Mood] : the mood was normal [No Anxiety] : not feeling anxious

## 2020-01-30 ENCOUNTER — RESULT REVIEW (OUTPATIENT)
Age: 80
End: 2020-01-30

## 2020-01-30 ENCOUNTER — APPOINTMENT (OUTPATIENT)
Dept: HEMATOLOGY ONCOLOGY | Facility: CLINIC | Age: 80
End: 2020-01-30
Payer: MEDICARE

## 2020-01-30 VITALS
OXYGEN SATURATION: 94 % | WEIGHT: 210.8 LBS | HEIGHT: 72.91 IN | HEART RATE: 65 BPM | SYSTOLIC BLOOD PRESSURE: 157 MMHG | RESPIRATION RATE: 16 BRPM | BODY MASS INDEX: 27.94 KG/M2 | DIASTOLIC BLOOD PRESSURE: 70 MMHG | TEMPERATURE: 98.1 F

## 2020-01-30 DIAGNOSIS — M79.661 PAIN IN RIGHT LOWER LEG: ICD-10-CM

## 2020-01-30 PROCEDURE — 99214 OFFICE O/P EST MOD 30 MIN: CPT

## 2020-01-30 NOTE — CONSULT LETTER
[Courtesy Letter:] : I had the pleasure of seeing your patient, [unfilled], in my office today. [Dear  ___] : Dear  [unfilled], [Consult Closing:] : Thank you very much for allowing me to participate in the care of this patient.  If you have any questions, please do not hesitate to contact me. [Please see my note below.] : Please see my note below. [Sincerely,] : Sincerely, [DrDariela  ___] : Dr. PRETTY [DrDariela ___] : Dr. PRETTY [FreeTextEntry3] : Rancho Jack MD, MPH\par Attending Physician\par Hematology Oncology\par [facility] [place]\par [facility]\par

## 2020-01-30 NOTE — HISTORY OF PRESENT ILLNESS
[de-identified] : Mr Young is a very pleasant 78 year old gentleman with HTN, HLD here to discuss management for her right LE DVT\par \par Around beginning of October- he felt as sharp pain righjt calf, no swelling, discomfort\par He went to a local physician - who did ultrasound- sent to [facility]- where he had repeat ultrasoud and was placed on xarelto \par Tolerated well\par \par He had a repeat ultrasound 1/31/19 which showed partially occlusive DVT of the right femoral and polpiteal veins with mild reduction of the venous return\par He was advised to stop xarelto 2/12/19\par \par He feels that past few days - he has started having the same pain right calf pain. No swelling\par \par \par He is retired. He goes to a intermediate program 5 days a week, swims 3 days a week\par He always sits cross legged, when not on a recliner\par Denies any long distance travel, surgeries, immobility prior to the event\par \par Has ho prostate cancer had radical prostatectomy [facility] ~ 5 years ago (9/2014).\par No DVT post surgery\par \par No personal or family ho DVT\par \par Colonoscopy (within last year) - Dr Lucien Aguillon - was normal \par \par \par Prothrombin gene mutation:\par HETEROZYGOUS\par INTERPRETATION:\par This patient is heterozygous for the Prothrombin [id number] mutation, and therefore\par at an increased risk(up to 3 fold) for thrombotic complications. However, the\par actual risk may be modified by synergistic interaction with other factors.\par Correlation with this patients clinical condition and/or with results of other\par relevant tests, and genetic counseling is suggested.\par \par Factor V leiden mutation, APS, protein S, AT3 def- negative\par \par Also saw Dr Martinez s/p EGD\par EGD:\par 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 Gastric polyp s/p biopsy\par 3. Irregular Z-line s/p biopsy\par  [de-identified] : He is seen today for follow up\par \par He reports pain in medial right thigh (like ice prick), calf (pressure like) and ankle (dull aching) since December 15, 2019\par He saw his PCP and has started taking meloxicam \par \par \par \par

## 2020-01-30 NOTE — ASSESSMENT
[FreeTextEntry1] : Right LE DVT\par Heterozygous prothrombin gene mutation\par Likely unprovoked\par He reports that he had an extensive occlusive right LE DVT- started having symptoms again since stopping xarelto\par Completed 6 months of AC (xarelto) in April 2019\par Reports right LE pain- repeat doppler ultrasound\par May need long term anticoagulation in event for another DVT\par Also advised to inform blood relative about the findings and have them evaluated by their PCPs\par Keep uptodate with cancer screening\par Consider prophylactic NOACs if D DImer continues to be elevated\par \par Iron deficiency anemia\par Feels better\par Labs today reviewed and discussed\par Ferritin pending. 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 CBC, CMP, ferritin\par

## 2020-01-30 NOTE — PHYSICAL EXAM
[Ambulatory and capable of all self care but unable to carry out any work activities] : Status 2- Ambulatory and capable of all self care but unable to carry out any work activities. Up and about more than 50% of waking hours [Normal] : normoactive bowel sounds, soft and nontender, no hepatosplenomegaly or masses appreciated [de-identified] : right> left leg swelling

## 2020-02-02 ENCOUNTER — RESULT REVIEW (OUTPATIENT)
Age: 80
End: 2020-02-02

## 2020-04-16 ENCOUNTER — APPOINTMENT (OUTPATIENT)
Dept: OPHTHALMOLOGY | Facility: CLINIC | Age: 80
End: 2020-04-16
Payer: MEDICARE

## 2020-04-16 ENCOUNTER — NON-APPOINTMENT (OUTPATIENT)
Age: 80
End: 2020-04-16

## 2020-04-16 PROCEDURE — 99213 OFFICE O/P EST LOW 20 MIN: CPT | Mod: 95

## 2020-05-20 ENCOUNTER — APPOINTMENT (OUTPATIENT)
Dept: CARDIOLOGY | Facility: CLINIC | Age: 80
End: 2020-05-20
Payer: MEDICARE

## 2020-05-20 ENCOUNTER — NON-APPOINTMENT (OUTPATIENT)
Age: 80
End: 2020-05-20

## 2020-05-20 VITALS
HEIGHT: 74 IN | BODY MASS INDEX: 26.05 KG/M2 | HEART RATE: 68 BPM | SYSTOLIC BLOOD PRESSURE: 140 MMHG | WEIGHT: 203 LBS | DIASTOLIC BLOOD PRESSURE: 70 MMHG

## 2020-05-20 DIAGNOSIS — I82.401 ACUTE EMBOLISM AND THROMBOSIS OF UNSPECIFIED DEEP VEINS OF RIGHT LOWER EXTREMITY: ICD-10-CM

## 2020-05-20 PROCEDURE — 99214 OFFICE O/P EST MOD 30 MIN: CPT

## 2020-05-20 PROCEDURE — 93000 ELECTROCARDIOGRAM COMPLETE: CPT

## 2020-05-20 NOTE — DISCUSSION/SUMMARY
[FreeTextEntry1] : The patient's condition was discussed at great length with him and his wife. First of all his cardiac status appears stable. There have been no symptoms of coronary ischemia or arrhythmias. The patient maintains a program of walking exercise.\par Patient has had a tendency to depression and appears to be concerned him somewhat anxious about the current pandemic. He continues to receive regular psychiatric treatment.\par The patient has had a number of visits with a hematologist. He was found last year to have iron deficiency with a low ferritin level. The exact source of the iron deficiency and anemia was never determined. He did have a complete workup for this. The anemia has completely resolved his most recent hemoglobin was greater than 15 g. 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 He will remain on aspirin as an anticoagulant. The full anticoagulation was discontinued due to the likelihood of occult GI blood loss previously.

## 2020-05-20 NOTE — PHYSICAL EXAM
[General Appearance - Well Developed] : well developed [Normal Appearance] : normal appearance [Well Groomed] : well groomed [General Appearance - Well Nourished] : well nourished [No Deformities] : no deformities [General Appearance - In No Acute Distress] : no acute distress [Normal Conjunctiva] : the conjunctiva exhibited no abnormalities [Eyelids - No Xanthelasma] : the eyelids demonstrated no xanthelasmas [Normal Oral Mucosa] : normal oral mucosa [No Oral Pallor] : no oral pallor [No Oral Cyanosis] : no oral cyanosis [Normal Jugular Venous A Waves Present] : normal jugular venous A waves present [Normal Jugular Venous V Waves Present] : normal jugular venous V waves present [No Jugular Venous Gaspar A Waves] : no jugular venous gaspar A waves [Respiration, Rhythm And Depth] : normal respiratory rhythm and effort [Exaggerated Use Of Accessory Muscles For Inspiration] : no accessory muscle use [Auscultation Breath Sounds / Voice Sounds] : lungs were clear to auscultation bilaterally [Heart Rate And Rhythm] : heart rate and rhythm were normal [Heart Sounds] : normal S1 and S2 [Murmurs] : no murmurs present [Abdomen Soft] : soft [Abdomen Tenderness] : non-tender [Abdomen Mass (___ Cm)] : no abdominal mass palpated [Abnormal Walk] : normal gait [Gait - Sufficient For Exercise Testing] : the gait was sufficient for exercise testing [Nail Clubbing] : no clubbing of the fingernails [Cyanosis, Localized] : no localized cyanosis [Petechial Hemorrhages (___cm)] : no petechial hemorrhages [] : no ischemic changes [Oriented To Time, Place, And Person] : oriented to person, place, and time [Affect] : the affect was normal [Mood] : the mood was normal [No Anxiety] : not feeling anxious [FreeTextEntry1] : PT PRONE TO DEPRESSION.

## 2020-07-07 ENCOUNTER — APPOINTMENT (OUTPATIENT)
Dept: OPHTHALMOLOGY | Facility: CLINIC | Age: 80
End: 2020-07-07
Payer: MEDICARE

## 2020-07-07 ENCOUNTER — NON-APPOINTMENT (OUTPATIENT)
Age: 80
End: 2020-07-07

## 2020-07-07 PROCEDURE — 99212 OFFICE O/P EST SF 10 MIN: CPT | Mod: 95

## 2020-09-14 NOTE — HISTORY OF PRESENT ILLNESS
Pt notified and verbalized understanding.    [FreeTextEntry1] : Fortunately during the course of this illness and subsequent treatment the patient has had no acute cardiac symptoms. There have been no symptoms of chest pain or palpitations.

## 2020-10-07 ENCOUNTER — RESULT CHARGE (OUTPATIENT)
Age: 80
End: 2020-10-07

## 2020-10-08 ENCOUNTER — NON-APPOINTMENT (OUTPATIENT)
Age: 80
End: 2020-10-08

## 2020-10-08 ENCOUNTER — APPOINTMENT (OUTPATIENT)
Dept: CARDIOLOGY | Facility: CLINIC | Age: 80
End: 2020-10-08
Payer: MEDICARE

## 2020-10-08 VITALS
DIASTOLIC BLOOD PRESSURE: 64 MMHG | SYSTOLIC BLOOD PRESSURE: 138 MMHG | WEIGHT: 193.13 LBS | OXYGEN SATURATION: 94 % | HEART RATE: 67 BPM | BODY MASS INDEX: 24.78 KG/M2 | HEIGHT: 74 IN

## 2020-10-08 DIAGNOSIS — Z86.39 PERSONAL HISTORY OF OTHER ENDOCRINE, NUTRITIONAL AND METABOLIC DISEASE: ICD-10-CM

## 2020-10-08 DIAGNOSIS — Z86.79 PERSONAL HISTORY OF OTHER DISEASES OF THE CIRCULATORY SYSTEM: ICD-10-CM

## 2020-10-08 PROCEDURE — 93000 ELECTROCARDIOGRAM COMPLETE: CPT

## 2020-10-08 PROCEDURE — 99213 OFFICE O/P EST LOW 20 MIN: CPT

## 2020-10-08 RX ORDER — LIOTHYRONINE SODIUM 50 UG/1
50 TABLET ORAL
Refills: 0 | Status: ACTIVE | COMMUNITY

## 2020-10-08 RX ORDER — AMLODIPINE BESYLATE 5 MG/1
5 TABLET ORAL DAILY
Refills: 0 | Status: ACTIVE | COMMUNITY

## 2020-10-08 RX ORDER — ATENOLOL 25 MG/1
25 TABLET ORAL TWICE DAILY
Refills: 0 | Status: ACTIVE | COMMUNITY

## 2020-10-08 RX ORDER — MULTIVIT-MIN/FA/LYCOPEN/LUTEIN .4-300-25
TABLET ORAL DAILY
Refills: 0 | Status: ACTIVE | COMMUNITY
Start: 2020-10-08

## 2020-10-08 RX ORDER — DAPAGLIFLOZIN 5 MG/1
5 TABLET, FILM COATED ORAL DAILY
Refills: 0 | Status: ACTIVE | COMMUNITY
Start: 2020-10-08

## 2020-10-08 RX ORDER — ATORVASTATIN CALCIUM 10 MG/1
10 TABLET, FILM COATED ORAL
Refills: 0 | Status: ACTIVE | COMMUNITY

## 2020-10-08 NOTE — REASON FOR VISIT
[Follow-Up - Clinic] : a clinic follow-up of [Chest Pain] : chest pain [FreeTextEntry1] : Mr. Young presents with c/o 2 days of intermittent sharp chest pain unrelated to exertion or food, non-radiating, no associated symptoms, lasting minutes and resolves spontaneously. He recently started swimming again. He was able to swim and exercise this week without recurrence of chest pain.

## 2020-10-08 NOTE — HISTORY OF PRESENT ILLNESS
[FreeTextEntry1] : 80 year old male with hypertension, hyperlipidemia, right LE DVT completed 6 month Xarelto therapy 4/2019.

## 2020-10-08 NOTE — REVIEW OF SYSTEMS
[Headache] : no headache [Shortness Of Breath] : no shortness of breath [Dyspnea on exertion] : not dyspnea during exertion [Chest Pain] : chest pain [Palpitations] : no palpitations [Cough] : no cough [Muscle Cramps] : no muscle cramps [Skin: A Rash] : no rash: [Dizziness] : no dizziness [Confusion] : no confusion was observed [FreeTextEntry2] : sharp pain at base of chest

## 2020-10-08 NOTE — PHYSICAL EXAM
[Normal Appearance] : normal appearance [Well Groomed] : well groomed [General Appearance - In No Acute Distress] : no acute distress [] : no respiratory distress [Respiration, Rhythm And Depth] : normal respiratory rhythm and effort [Auscultation Breath Sounds / Voice Sounds] : lungs were clear to auscultation bilaterally [Heart Rate And Rhythm] : heart rate and rhythm were normal [Heart Sounds] : normal S1 and S2 [Murmurs] : no murmurs present [Edema] : no peripheral edema present [Abnormal Walk] : normal gait [Skin Color & Pigmentation] : normal skin color and pigmentation [Oriented To Time, Place, And Person] : oriented to person, place, and time [Impaired Insight] : insight and judgment were intact [Affect] : the affect was normal

## 2020-10-20 ENCOUNTER — RESULT REVIEW (OUTPATIENT)
Age: 80
End: 2020-10-20

## 2020-10-20 ENCOUNTER — APPOINTMENT (OUTPATIENT)
Dept: HEMATOLOGY ONCOLOGY | Facility: CLINIC | Age: 80
End: 2020-10-20
Payer: MEDICARE

## 2020-10-20 VITALS
TEMPERATURE: 98 F | OXYGEN SATURATION: 96 % | DIASTOLIC BLOOD PRESSURE: 67 MMHG | HEIGHT: 74 IN | BODY MASS INDEX: 24.77 KG/M2 | RESPIRATION RATE: 16 BRPM | SYSTOLIC BLOOD PRESSURE: 132 MMHG | HEART RATE: 70 BPM | WEIGHT: 193 LBS

## 2020-10-20 PROCEDURE — 99215 OFFICE O/P EST HI 40 MIN: CPT

## 2020-10-21 NOTE — HISTORY OF PRESENT ILLNESS
[de-identified] : Mr Young is a very pleasant 78 year old gentleman with HTN, HLD here to discuss management for her right LE DVT\par \par Around beginning of October- he felt as sharp pain righjt calf, no swelling, discomfort\par He went to a local physician - who did ultrasound- sent to [facility]- where he had repeat ultrasoud and was placed on xarelto \par Tolerated well\par \par He had a repeat ultrasound 1/31/19 which showed partially occlusive DVT of the right femoral and polpiteal veins with mild reduction of the venous return\par He was advised to stop xarelto 2/12/19\par \par He feels that past few days - he has started having the same pain right calf pain. No swelling\par \par \par He is retired. He goes to a correction program 5 days a week, swims 3 days a week\par He always sits cross legged, when not on a recliner\par Denies any long distance travel, surgeries, immobility prior to the event\par \par Has ho prostate cancer had radical prostatectomy [facility] ~ 5 years ago (9/2014).\par No DVT post surgery\par \par No personal or family ho DVT\par \par Colonoscopy (within last year) - Dr Lucien Aguillon - was normal \par \par \par Prothrombin gene mutation:\par HETEROZYGOUS\par INTERPRETATION:\par This patient is heterozygous for the Prothrombin [id number] mutation, and therefore\par at an increased risk(up to 3 fold) for thrombotic complications. However, the\par actual risk may be modified by synergistic interaction with other factors.\par Correlation with this patients clinical condition and/or with results of other\par relevant tests, and genetic counseling is suggested.\par \par Factor V leiden mutation, APS, protein S, AT3 def- negative\par \par Also saw Dr Martinez s/p EGD\par EGD:\par 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 Gastric polyp s/p biopsy\par 3. Irregular Z-line s/p biopsy\par  [de-identified] : He is seen today for follow up\par He was last seen in January 2020\par \par He had blood work with his PCP in [place], [place]- \par Ferritin 80 (5/2020) dropped to 19.8 910/16/20)\par \par He feels shortness of breath on exertion\par He has started swimming again, Now he is sob with couple of laps\par He feels tired and fatigued\par \par He continues to have right leg pain. Feels it secondary to his diabetic neuropathy\par \par He denies any BRBPR, melena

## 2020-10-21 NOTE — ASSESSMENT
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 Can also consider [place] Bleeding study\par They want to think about options and get back to me PRN\par \par Right LE DVT\par Heterozygous prothrombin gene mutation\par Likely unprovoked\par He reports that he had an extensive occlusive right LE DVT- started having symptoms again since stopping xarelto\par Completed 6 months of AC (xarelto) in April 2019\par Reports right LE pain- repeat doppler ultrasound\par May need long term anticoagulation in event for another DVT\par Also advised to inform blood relative about the findings and have them evaluated by their PCPs\par Keep uptodate with cancer screening\par Prophylactic DOACs not pursued due to his ongoing bleeding issues\par \par Follow up in 2 months. CBC, CMP, ferritin\par

## 2020-10-21 NOTE — PHYSICAL EXAM
[Ambulatory and capable of all self care but unable to carry out any work activities] : Status 2- Ambulatory and capable of all self care but unable to carry out any work activities. Up and about more than 50% of waking hours [Normal] : no peripheral adenopathy appreciated [de-identified] : right> left leg swelling

## 2020-10-21 NOTE — CONSULT LETTER
[Dear  ___] : Dear  [unfilled], [Courtesy Letter:] : I had the pleasure of seeing your patient, [unfilled], in my office today. [Please see my note below.] : Please see my note below. [Consult Closing:] : Thank you very much for allowing me to participate in the care of this patient.  If you have any questions, please do not hesitate to contact me. [Sincerely,] : Sincerely, [DrDariela  ___] : Dr. PRETTY [DrDariela ___] : Dr. PRETTY [FreeTextEntry3] : Rancho Jack MD, MPH\par Attending Physician\par Hematology Oncology\par [facility] [place]\par [facility]\par

## 2020-10-28 ENCOUNTER — APPOINTMENT (OUTPATIENT)
Dept: CARDIOLOGY | Facility: CLINIC | Age: 80
End: 2020-10-28
Payer: MEDICARE

## 2020-10-28 VITALS
HEART RATE: 67 BPM | BODY MASS INDEX: 24.9 KG/M2 | DIASTOLIC BLOOD PRESSURE: 60 MMHG | WEIGHT: 194 LBS | HEIGHT: 74 IN | SYSTOLIC BLOOD PRESSURE: 140 MMHG

## 2020-10-28 PROCEDURE — 99214 OFFICE O/P EST MOD 30 MIN: CPT

## 2020-10-28 NOTE — REASON FOR VISIT
[FreeTextEntry1] : The patient was seen several weeks ago for atypical discomfort. Those symptoms have essentially resolved. He does describe a discomfort across the upper part of the back which in some ways has interfered with his exercise. The patient however remains very active with swimming and other types of aerobic exercise. He does note slight shortness of breath with vigorous exertion.

## 2020-10-28 NOTE — DISCUSSION/SUMMARY
[FreeTextEntry1] : The patient's main complaint today is a discomfort and pain across the upper back emanating from the area of the cervical spine. Symptoms occurring mostly at rest. The patient has resumed a program of full exercise. This includes swimming and other forms of aerobics including jumping jacks. He does complain of some shortness of breath on exertion which she perceives. I can find no evidence of a cardiac etiology of the shortness of breath. The patient is being treated for an iron deficiency anemia of 12.8. This may be contributing to his perception of shortness of breath. The patient is due for an iron infusion later this week. In order to further evaluate the symptoms of cervical pain I am going to order an MRI of the cervical spine. Because of the ongoing issue of iron deficiency anemia and possible GI bleeding I am recommending that aspirin be discontinued at the present time.

## 2020-10-28 NOTE — PHYSICAL EXAM
[General Appearance - Well Developed] : well developed [Normal Appearance] : normal appearance [Well Groomed] : well groomed [General Appearance - Well Nourished] : well nourished [No Deformities] : no deformities [General Appearance - In No Acute Distress] : no acute distress [Normal Conjunctiva] : the conjunctiva exhibited no abnormalities [Eyelids - No Xanthelasma] : the eyelids demonstrated no xanthelasmas [Normal Oral Mucosa] : normal oral mucosa [No Oral Pallor] : no oral pallor [No Oral Cyanosis] : no oral cyanosis [Normal Jugular Venous A Waves Present] : normal jugular venous A waves present [Normal Jugular Venous V Waves Present] : normal jugular venous V waves present [No Jugular Venous Gaspar A Waves] : no jugular venous gaspar A waves [Respiration, Rhythm And Depth] : normal respiratory rhythm and effort [Exaggerated Use Of Accessory Muscles For Inspiration] : no accessory muscle use [Auscultation Breath Sounds / Voice Sounds] : lungs were clear to auscultation bilaterally [Heart Rate And Rhythm] : heart rate and rhythm were normal [Heart Sounds] : normal S1 and S2 [Murmurs] : no murmurs present [Abdomen Soft] : soft [Abdomen Tenderness] : non-tender [Abdomen Mass (___ Cm)] : no abdominal mass palpated [Abnormal Walk] : normal gait [Gait - Sufficient For Exercise Testing] : the gait was sufficient for exercise testing [Nail Clubbing] : no clubbing of the fingernails [Cyanosis, Localized] : no localized cyanosis [Petechial Hemorrhages (___cm)] : no petechial hemorrhages [Skin Color & Pigmentation] : normal skin color and pigmentation [] : no rash [No Venous Stasis] : no venous stasis [Skin Lesions] : no skin lesions [No Skin Ulcers] : no skin ulcer [No Xanthoma] : no  xanthoma was observed [Oriented To Time, Place, And Person] : oriented to person, place, and time [Affect] : the affect was normal [Mood] : the mood was normal [No Anxiety] : not feeling anxious

## 2020-12-18 ENCOUNTER — RESULT REVIEW (OUTPATIENT)
Age: 80
End: 2020-12-18

## 2020-12-18 ENCOUNTER — APPOINTMENT (OUTPATIENT)
Dept: HEMATOLOGY ONCOLOGY | Facility: CLINIC | Age: 80
End: 2020-12-18
Payer: MEDICARE

## 2020-12-18 VITALS
WEIGHT: 189 LBS | BODY MASS INDEX: 24.26 KG/M2 | OXYGEN SATURATION: 93 % | SYSTOLIC BLOOD PRESSURE: 134 MMHG | DIASTOLIC BLOOD PRESSURE: 70 MMHG | HEIGHT: 74.02 IN | RESPIRATION RATE: 18 BRPM | TEMPERATURE: 98 F | HEART RATE: 57 BPM

## 2020-12-18 PROCEDURE — 99214 OFFICE O/P EST MOD 30 MIN: CPT

## 2020-12-18 NOTE — CONSULT LETTER
[Dear  ___] : Dear  [unfilled], [Courtesy Letter:] : I had the pleasure of seeing your patient, [unfilled], in my office today. [Please see my note below.] : Please see my note below. [Consult Closing:] : Thank you very much for allowing me to participate in the care of this patient.  If you have any questions, please do not hesitate to contact me. [Sincerely,] : Sincerely, [DrDariela  ___] : Dr. PRETTY [DrDariela ___] : Dr. PRETTY [FreeTextEntry3] : Rancho Jack MD, MPH\par Attending Physician\par Hematology Oncology\par [facility] [place]\par [facility]\par

## 2020-12-18 NOTE — PHYSICAL EXAM
[Ambulatory and capable of all self care but unable to carry out any work activities] : Status 2- Ambulatory and capable of all self care but unable to carry out any work activities. Up and about more than 50% of waking hours [Normal] : no peripheral adenopathy appreciated [de-identified] : right> left leg swelling

## 2020-12-18 NOTE — HISTORY OF PRESENT ILLNESS
[de-identified] : Mr Young is a very pleasant 78 year old gentleman with HTN, HLD here to discuss management for her right LE DVT\par \par Around beginning of October- he felt as sharp pain righjt calf, no swelling, discomfort\par He went to a local physician - who did ultrasound- sent to [facility]- where he had repeat ultrasoud and was placed on xarelto \par Tolerated well\par \par He had a repeat ultrasound 1/31/19 which showed partially occlusive DVT of the right femoral and polpiteal veins with mild reduction of the venous return\par He was advised to stop xarelto 2/12/19\par \par He feels that past few days - he has started having the same pain right calf pain. No swelling\par \par \par He is retired. He goes to a half-way program 5 days a week, swims 3 days a week\par He always sits cross legged, when not on a recliner\par Denies any long distance travel, surgeries, immobility prior to the event\par \par Has ho prostate cancer had radical prostatectomy [facility] ~ 5 years ago (9/2014).\par No DVT post surgery\par \par No personal or family ho DVT\par \par Colonoscopy (within last year) - Dr Lucien Aguillon - was normal \par \par \par Prothrombin gene mutation:\par HETEROZYGOUS\par INTERPRETATION:\par This patient is heterozygous for the Prothrombin [id number] mutation, and therefore\par at an increased risk(up to 3 fold) for thrombotic complications. However, the\par actual risk may be modified by synergistic interaction with other factors.\par Correlation with this patients clinical condition and/or with results of other\par relevant tests, and genetic counseling is suggested.\par \par Factor V leiden mutation, APS, protein S, AT3 def- negative\par \par Also saw Dr Martinez s/p EGD\par EGD:\par 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 Gastric polyp s/p biopsy\par 3. Irregular Z-line s/p biopsy\par \par Ferritin 80 (5/2020) dropped to 19.8 910/16/20)\par  [de-identified] : He is seen today for follow up\par He is s/p IV injectafer 750 mg once a week x 2\par \par He had blood work with his PCP in [place], [place] (12/14/20)-\par Ferritin 157. NO CBC\par  \par He feels better with IV iron\par Now able to swim for half hours\par He has lost 4 lbs since his last appointment\par He thinks it is unintentional\par No shortness of breath on exertion\par \par He had capsule endoscopy, MRI with Dr Lucien Aguillon (GI) and was told it was normal\par \par He continues to have right leg pain. Feels it secondary to his diabetic neuropathy\par

## 2020-12-18 NOTE — ASSESSMENT
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\par He had capsule endoscopy, MRI with Dr Lucien Aguillon (GI) in 2020 and was told it was normal\par \par He is s/p IV injectafer 750 mg once a week x 2\par Reports significant improvement in his symptoms\par Labs reviewed, analyzed and discussed\par Hgb improved\par Ferritin >100\par No IV iron for now\par Getting work up with his GI (Dr Lucien Aguillon)\par \par Right LE DVT\par Heterozygous prothrombin gene mutation\par Likely unprovoked\par He reports that he had an extensive occlusive right LE DVT- started having symptoms again since stopping xarelto\par Completed 6 months of AC (xarelto) in April 2019\par Reports right LE pain- repeat doppler ultrasound\par May need long term anticoagulation in event for another DVT\par Also advised to inform blood relative about the findings and have them evaluated by their PCPs\par Keep uptodate with cancer screening\par Prophylactic DOACs not pursued due to his ongoing bleeding issues\par \par Labs in 3 months. CBC, CMP, ferritin (he usually does it locally with his PCP)\par Office visit few days later

## 2021-03-04 ENCOUNTER — NON-APPOINTMENT (OUTPATIENT)
Age: 81
End: 2021-03-04

## 2021-03-04 ENCOUNTER — APPOINTMENT (OUTPATIENT)
Dept: OPHTHALMOLOGY | Facility: CLINIC | Age: 81
End: 2021-03-04
Payer: MEDICARE

## 2021-03-04 PROCEDURE — 92014 COMPRE OPH EXAM EST PT 1/>: CPT

## 2021-03-17 ENCOUNTER — RESULT REVIEW (OUTPATIENT)
Age: 81
End: 2021-03-17

## 2021-03-17 ENCOUNTER — APPOINTMENT (OUTPATIENT)
Dept: HEMATOLOGY ONCOLOGY | Facility: CLINIC | Age: 81
End: 2021-03-17
Payer: MEDICARE

## 2021-03-17 VITALS
OXYGEN SATURATION: 94 % | HEIGHT: 74.02 IN | HEART RATE: 60 BPM | RESPIRATION RATE: 18 BRPM | BODY MASS INDEX: 24.26 KG/M2 | WEIGHT: 189 LBS | TEMPERATURE: 97.8 F | DIASTOLIC BLOOD PRESSURE: 63 MMHG | SYSTOLIC BLOOD PRESSURE: 125 MMHG

## 2021-03-17 DIAGNOSIS — Z11.59 ENCOUNTER FOR SCREENING FOR OTHER VIRAL DISEASES: ICD-10-CM

## 2021-03-17 PROCEDURE — 99214 OFFICE O/P EST MOD 30 MIN: CPT

## 2021-03-17 NOTE — HISTORY OF PRESENT ILLNESS
[de-identified] : Mr Young is a very pleasant 78 year old gentleman with HTN, HLD here to discuss management for her right LE DVT\par \par Around beginning of October- he felt as sharp pain righjt calf, no swelling, discomfort\par He went to a local physician - who did ultrasound- sent to [facility]- where he had repeat ultrasoud and was placed on xarelto \par Tolerated well\par \par He had a repeat ultrasound 1/31/19 which showed partially occlusive DVT of the right femoral and polpiteal veins with mild reduction of the venous return\par He was advised to stop xarelto 2/12/19\par \par He feels that past few days - he has started having the same pain right calf pain. No swelling\par \par \par He is retired. He goes to a half-way program 5 days a week, swims 3 days a week\par He always sits cross legged, when not on a recliner\par Denies any long distance travel, surgeries, immobility prior to the event\par \par Has ho prostate cancer had radical prostatectomy [facility] ~ 5 years ago (9/2014).\par No DVT post surgery\par \par No personal or family ho DVT\par \par Colonoscopy (within last year) - Dr Lucien Aguillon - was normal \par \par \par Prothrombin gene mutation:\par HETEROZYGOUS\par INTERPRETATION:\par This patient is heterozygous for the Prothrombin [id number] mutation, and therefore\par at an increased risk(up to 3 fold) for thrombotic complications. However, the\par actual risk may be modified by synergistic interaction with other factors.\par Correlation with this patients clinical condition and/or with results of other\par relevant tests, and genetic counseling is suggested.\par \par Factor V leiden mutation, APS, protein S, AT3 def- negative\par \par Also saw Dr Martinez s/p EGD\par EGD:\par 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 Gastric polyp s/p biopsy\par 3. Irregular Z-line s/p biopsy\par \par Ferritin 80 (5/2020) dropped to 19.8 910/16/20)\par \par He had capsule endoscopy, MRI with Dr Lucien Aguillon (GI) and was told it was normal\par  [de-identified] : He is seen today for follow up\par \par He co jos - saw Dr Lucien Aguillon (GI)- had manometric studies - scheduled for another colonoscopy\par He remains active teaching classes\par \par He has received COVID Vaccine x 2\par \par He has not lost any weight since his last appointment\par \par He had blood work done at outside labs on 3/8/21\par Ferritin 36.4

## 2021-03-17 NOTE — ASSESSMENT
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\par He had capsule endoscopy, MRI with Dr Lucien Aguillon (GI) in 2020 and was told it was normal\par \par He is seen today for follow up\par Feels tired\par Labs reviewed, analyzed and discussed\par Ferritin 36.4\par Schedule IV injectafer 750 mg x 2\par He has tenesmus - scheduled for repeat colonoscopy with GI (Dr Lucien Aguillon)\par \par Right LE DVT\par Heterozygous prothrombin gene mutation\par Likely unprovoked\par He reports that he had an extensive occlusive right LE DVT- started having symptoms again since stopping xarelto\par Completed 6 months of AC (xarelto) in April 2019\par Reports right LE pain- repeat doppler ultrasound\par May need long term anticoagulation in event for another DVT\par Also advised to inform blood relative about the findings and have them evaluated by their PCPs\par Keep uptodate with cancer screening\par Prophylactic DOACs not pursued due to his ongoing bleeding issues\par \par Labs in 3 months. CBC, CMP, ferritin (he usually does it locally with his PCP)\par Office visit few days later

## 2021-03-17 NOTE — PHYSICAL EXAM
[Ambulatory and capable of all self care but unable to carry out any work activities] : Status 2- Ambulatory and capable of all self care but unable to carry out any work activities. Up and about more than 50% of waking hours [Normal] : no peripheral adenopathy appreciated [de-identified] : right> left leg swelling

## 2021-03-17 NOTE — CONSULT LETTER
[Dear  ___] : Dear  [unfilled], [Courtesy Letter:] : I had the pleasure of seeing your patient, [unfilled], in my office today. [Please see my note below.] : Please see my note below. [Consult Closing:] : Thank you very much for allowing me to participate in the care of this patient.  If you have any questions, please do not hesitate to contact me. [Sincerely,] : Sincerely, [DrDariela  ___] : Dr. PRETTY [DrDariela ___] : Dr. PRETTY [FreeTextEntry3] : Rancho Jack MD, MPH\par Attending Physician\par Hematology Oncology\par [facility] [place]\par [facility]\par

## 2021-04-28 ENCOUNTER — NON-APPOINTMENT (OUTPATIENT)
Age: 81
End: 2021-04-28

## 2021-04-28 ENCOUNTER — APPOINTMENT (OUTPATIENT)
Dept: CARDIOLOGY | Facility: CLINIC | Age: 81
End: 2021-04-28
Payer: MEDICARE

## 2021-04-28 VITALS
HEART RATE: 56 BPM | SYSTOLIC BLOOD PRESSURE: 130 MMHG | HEIGHT: 74 IN | BODY MASS INDEX: 24.38 KG/M2 | DIASTOLIC BLOOD PRESSURE: 70 MMHG | WEIGHT: 190 LBS | TEMPERATURE: 98.4 F

## 2021-04-28 DIAGNOSIS — Z86.59 PERSONAL HISTORY OF OTHER MENTAL AND BEHAVIORAL DISORDERS: ICD-10-CM

## 2021-04-28 DIAGNOSIS — R06.00 DYSPNEA, UNSPECIFIED: ICD-10-CM

## 2021-04-28 PROCEDURE — 93351 STRESS TTE COMPLETE: CPT

## 2021-04-28 PROCEDURE — 99213 OFFICE O/P EST LOW 20 MIN: CPT | Mod: 25

## 2021-04-28 RX ORDER — METFORMIN HYDROCHLORIDE 1000 MG/1
1000 TABLET, COATED ORAL TWICE DAILY
Refills: 0 | Status: DISCONTINUED | COMMUNITY
End: 2021-04-28

## 2021-04-28 NOTE — HISTORY OF PRESENT ILLNESS
[FreeTextEntry1] : the patient when I first met him was 230 pounds and he has lost weight gradually to a level of about 190 at the present time the

## 2021-04-28 NOTE — DISCUSSION/SUMMARY
[FreeTextEntry1] : The patient's clinical condition is substantially improved. Symptoms of severe back pain and neck pain have subsided. The patient's anemia has responded to iron infusions. No definite source of blood loss was ever identified. The patient is off blood thinners and aspirin at this time. There was a previous history of DVT and we must be vigilant for this. That may have been precipitated by lack of exercise during that particular time. Presently the patient has had no acute cardiac symptoms there have been no symptoms of acute chest pain shortness of breath dizziness lightheadedness or palpitations. He has resumed a full program of exercise including swimming and other exercises. Symptoms of depression have lifted. Today's stress echo reveals no evidence of exercise-induced myocardial ischemia or arrhythmias and the left ventricular function is normal. Based on today's examination I find the patient's cardiovascular status to be stable. The current medications will be continued.

## 2021-04-28 NOTE — REASON FOR VISIT
[CV Risk Factors and Non-Cardiac Disease] : CV risk factors and non-cardiac disease [FreeTextEntry1] : The patient is followed with a diagnosis of previous MI (never confirmed), hypertension, diabetes mellitus, history of anemia and DVT. There is chronic right bundle branch block. The patient offers no acute cardiac symptoms at this time.

## 2021-06-16 ENCOUNTER — RESULT REVIEW (OUTPATIENT)
Age: 81
End: 2021-06-16

## 2021-06-16 ENCOUNTER — APPOINTMENT (OUTPATIENT)
Dept: HEMATOLOGY ONCOLOGY | Facility: CLINIC | Age: 81
End: 2021-06-16
Payer: MEDICARE

## 2021-06-16 VITALS
HEIGHT: 74.02 IN | OXYGEN SATURATION: 95 % | DIASTOLIC BLOOD PRESSURE: 59 MMHG | TEMPERATURE: 97.4 F | HEART RATE: 54 BPM | BODY MASS INDEX: 24.26 KG/M2 | RESPIRATION RATE: 16 BRPM | SYSTOLIC BLOOD PRESSURE: 131 MMHG | WEIGHT: 189 LBS

## 2021-06-16 PROCEDURE — 99214 OFFICE O/P EST MOD 30 MIN: CPT

## 2021-06-16 NOTE — HISTORY OF PRESENT ILLNESS
[de-identified] : Mr Young is a very pleasant 78 year old gentleman with HTN, HLD here to discuss management for her right LE DVT\par \par Around beginning of October- he felt as sharp pain righjt calf, no swelling, discomfort\par He went to a local physician - who did ultrasound- sent to [facility]- where he had repeat ultrasoud and was placed on xarelto \par Tolerated well\par \par He had a repeat ultrasound 1/31/19 which showed partially occlusive DVT of the right femoral and polpiteal veins with mild reduction of the venous return\par He was advised to stop xarelto 2/12/19\par \par He feels that past few days - he has started having the same pain right calf pain. No swelling\par \par \par He is retired. He goes to a assisted program 5 days a week, swims 3 days a week\par He always sits cross legged, when not on a recliner\par Denies any long distance travel, surgeries, immobility prior to the event\par \par Has ho prostate cancer had radical prostatectomy [facility] ~ 5 years ago (9/2014).\par No DVT post surgery\par \par No personal or family ho DVT\par \par Colonoscopy (within last year) - Dr Lucien Aguillon - was normal \par \par \par Prothrombin gene mutation:\par HETEROZYGOUS\par INTERPRETATION:\par This patient is heterozygous for the Prothrombin [id number] mutation, and therefore\par at an increased risk(up to 3 fold) for thrombotic complications. However, the\par actual risk may be modified by synergistic interaction with other factors.\par Correlation with this patients clinical condition and/or with results of other\par relevant tests, and genetic counseling is suggested.\par \par Factor V leiden mutation, APS, protein S, AT3 def- negative\par \par Also saw Dr Martinez s/p EGD\par EGD:\par 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 Gastric polyp s/p biopsy\par 3. Irregular Z-line s/p biopsy\par \par Ferritin 80 (5/2020) dropped to 19.8 910/16/20)\par \par He had capsule endoscopy, MRI with Dr Lucien Aguillon (GI) and was told it was normal\par  [de-identified] : He is seen today for follow up\par Accompanied by his wife\par He has received COVID Vaccine x 2\par \par He has been swimming every day\par Fatigue, sob have not recurred\par \par He co luizesmus - saw Dr Lucien Aguillon (GI)- had manometric studies - had colonoscopy which was "okay"\par He remains active teaching classes\par

## 2021-06-16 NOTE — CONSULT LETTER
[Dear  ___] : Dear  [unfilled], [Courtesy Letter:] : I had the pleasure of seeing your patient, [unfilled], in my office today. [Please see my note below.] : Please see my note below. [Consult Closing:] : Thank you very much for allowing me to participate in the care of this patient.  If you have any questions, please do not hesitate to contact me. [Sincerely,] : Sincerely, [DrDariela  ___] : Dr. PRETTY [DrDariela ___] : Dr. PRETTY [FreeTextEntry3] : Rancho Jack MD, MPH\par Attending Physician\par Hematology Oncology\par [facility] [place]\par [facility]\par

## 2021-06-16 NOTE — PHYSICAL EXAM
[Ambulatory and capable of all self care but unable to carry out any work activities] : Status 2- Ambulatory and capable of all self care but unable to carry out any work activities. Up and about more than 50% of waking hours [Normal] : no peripheral adenopathy appreciated [de-identified] : right> left leg swelling

## 2021-06-16 NOTE — ASSESSMENT
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\par He had capsule endoscopy, MRI with Dr Lucien Aguillon (GI) in 2020 and was told it was normal\par He is seen today for follow up\par Feels good\par Labs reviewed, analyzed and discussed\par Ferritin pending\par Patient will call to discuss findings/results in a few days\par He co jos - saw Dr Lucien Aguillon (GI)- had manometric studies - had colonoscopy which was "okay"\par \par Right LE DVT\par Heterozygous prothrombin gene mutation\par Likely unprovoked\par He reports that he had an extensive occlusive right LE DVT- started having symptoms again since stopping xarelto\par Completed 6 months of AC (xarelto) in April 2019\par Reports right LE pain- repeat doppler ultrasound\par May need long term anticoagulation in event for another DVT\par Also advised to inform blood relative about the findings and have them evaluated by their PCPs\par Keep uptodate with cancer screening\par Prophylactic DOACs not pursued due to his ongoing bleeding issues\par \par Labs in 3 months. CBC, CMP, ferritin (he usually does it locally with his PCP)\par Office visit few days later

## 2021-06-22 ENCOUNTER — APPOINTMENT (OUTPATIENT)
Dept: HEMATOLOGY ONCOLOGY | Facility: CLINIC | Age: 81
End: 2021-06-22

## 2021-08-26 ENCOUNTER — APPOINTMENT (OUTPATIENT)
Dept: NEUROLOGY | Facility: CLINIC | Age: 81
End: 2021-08-26
Payer: MEDICARE

## 2021-08-26 VITALS
DIASTOLIC BLOOD PRESSURE: 66 MMHG | BODY MASS INDEX: 24.26 KG/M2 | WEIGHT: 189 LBS | SYSTOLIC BLOOD PRESSURE: 144 MMHG | TEMPERATURE: 97.7 F | HEART RATE: 57 BPM | HEIGHT: 74 IN

## 2021-08-26 PROCEDURE — 99203 OFFICE O/P NEW LOW 30 MIN: CPT

## 2021-08-26 NOTE — CONSULT LETTER
[Dear  ___] : Dear  [unfilled], [Consult Letter:] : I had the pleasure of evaluating your patient, [unfilled]. [Consult Closing:] : Thank you very much for allowing me to participate in the care of this patient.  If you have any questions, please do not hesitate to contact me. [Sincerely,] : Sincerely, [FreeTextEntry3] : Nina Cruz M.D.

## 2021-08-26 NOTE — ASSESSMENT
[FreeTextEntry1] : This is a 81-year-old man with multiple issues:\par \par The right thigh and foot pain are musculoskeletal in nature.  There is no evidence clinically of a lumbosacral radiculopathy.\par \par Memory changes - which may be related to depression and normal aging.\par Vitamin B12 level\par TSH\par MRI brain\par Neuropsychological evaluation. \par Follow over time. 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 Serological workup to look for any other identifiable causes.  It is not painful.  There is no indication for any medication for neuropathic pain at this time.\par Daily foot exams\par He already sees a podiatrist\par EMG of legs\par \par Follow-up with MARIELLA Dunlap 1 week after the EMG.\par \par Follow-up with Dr. Cruz in November\par \par I discussed in detail with the patient the diagnosis, prognosis, treatment plan and answered all of their questions.\par

## 2021-08-26 NOTE — REASON FOR VISIT
[Consultation] : a consultation visit [FreeTextEntry1] : Pt is complaining of Rt leg and foot pain, he states he was told he had neuropathy.

## 2021-08-26 NOTE — REVIEW OF SYSTEMS
[Numbness] : numbness [Tingling] : tingling [Joint Pain] : joint pain [Limb Pain] : limb pain [Negative] : Heme/Lymph

## 2021-08-26 NOTE — HISTORY OF PRESENT ILLNESS
[FreeTextEntry1] : This is a 81-year-old man with several months of pain.  He has 1 very localized sharp pain in the medial thigh.  There is tenderness there.  He also has pain in the medial foot in the arch which sometimes radiates to the big toe.  He has occasional feeling of numbness in both feet.  He is not sure how long this has been.  He never had any low back pain and no radiating pain in the legs.  He has no urinary problems.  He has problems with constipation.\par The pain is better if he is moving around.  There is tenderness in the medial thigh and medial foot.\par He has had diabetes mellitus for 1 to 2 years.  He has Dupuytren's contracture, bilaterally.\par He has a history of alcohol abuse and has been a member of [employer]ics Anonymous for 45 years.  He has not drank alcohol for decades.\par He has a history of depression, he has had ECT in the distant past.\par \par He says that he has slight short-term memory problems but he feels this is due to impaired attention.  He is able to perform all of his IADLs.

## 2021-08-26 NOTE — PHYSICAL EXAM
[FreeTextEntry1] : Physical examination \par General: No acute distress, Awake, Alert.   \par \par Mental status \par MoCA\par 8/26/2021 - 20/30; memory 1/5\par \par Cranial Nerves \par II: VFF  \par III, IV, VI: PERRL, EOMI.   \par V: Facial sensation is normal B/L.   \par VII: Facial strength is normal B/L. \par \par \par VIII: Decreased hearing, bilaterally. \par \par IX, X: Palate is midline and elevates symmetrically.   \par XI: Trapezius normal strength.   \par XII: Tongue midline without atrophy or fasciculations. \par \par Motor exam  \par Muscle tone -mild cogwheel rigidity in both arms.\par Tremor with action and posture: High-frequency, low amplitude..  \par No atrophy or fasciculations \par Muscle Strength: arms and legs, proximal and distal flexors and extensors are normal \par \par No UE drift.\par \par Reflexes \par All present, normal, and symmetrical -except for the ankles 1\par Plantars right: mute.   \par Plantars left: mute.   \par \par Coordination \par Slow, no ataxia, tremor with action- FNF, DONALD, HKS. \par \par Sensory \par Decreased sensation to vibration in the toes.  Stocking distribution decreased sensation to pinprick long-term up the lower leg.  Reduced distal sensation to cold and vibration. \par \par Gait \par He is able to walk on his heels and toes and do a few steps with tandem gait.  \par Slow, wide based gait. \par \par

## 2021-09-09 ENCOUNTER — NON-APPOINTMENT (OUTPATIENT)
Age: 81
End: 2021-09-09

## 2021-09-09 ENCOUNTER — APPOINTMENT (OUTPATIENT)
Dept: OPHTHALMOLOGY | Facility: CLINIC | Age: 81
End: 2021-09-09
Payer: MEDICARE

## 2021-09-09 PROCEDURE — 92014 COMPRE OPH EXAM EST PT 1/>: CPT

## 2021-09-14 ENCOUNTER — APPOINTMENT (OUTPATIENT)
Dept: HEMATOLOGY ONCOLOGY | Facility: CLINIC | Age: 81
End: 2021-09-14

## 2021-09-14 ENCOUNTER — RESULT REVIEW (OUTPATIENT)
Age: 81
End: 2021-09-14

## 2021-09-14 VITALS
SYSTOLIC BLOOD PRESSURE: 144 MMHG | BODY MASS INDEX: 24.96 KG/M2 | TEMPERATURE: 98.7 F | OXYGEN SATURATION: 92 % | HEIGHT: 73.98 IN | WEIGHT: 194.5 LBS | HEART RATE: 60 BPM | RESPIRATION RATE: 16 BRPM | DIASTOLIC BLOOD PRESSURE: 61 MMHG

## 2021-09-21 ENCOUNTER — RESULT REVIEW (OUTPATIENT)
Age: 81
End: 2021-09-21

## 2021-09-21 ENCOUNTER — APPOINTMENT (OUTPATIENT)
Dept: HEMATOLOGY ONCOLOGY | Facility: CLINIC | Age: 81
End: 2021-09-21
Payer: MEDICARE

## 2021-09-21 VITALS
SYSTOLIC BLOOD PRESSURE: 140 MMHG | WEIGHT: 195.8 LBS | OXYGEN SATURATION: 95 % | TEMPERATURE: 97.1 F | HEART RATE: 57 BPM | DIASTOLIC BLOOD PRESSURE: 63 MMHG | BODY MASS INDEX: 25.13 KG/M2 | HEIGHT: 73.98 IN | RESPIRATION RATE: 18 BRPM

## 2021-09-21 PROCEDURE — 99213 OFFICE O/P EST LOW 20 MIN: CPT

## 2021-09-21 NOTE — ASSESSMENT
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 \par He's feeling well with no complaints.\par To repeat labs locally in 4-6 weeks and reach out to us to go over results. To give IV Iron PRN.\par Advised to follow up with GI Dr. Aguillon. \par \par #Right LE DVT\par Heterozygous prothrombin gene mutation\par Likely unprovoked\par He reports that he had an extensive occlusive right LE DVT- started having symptoms again since stopping xarelto\par Completed 6 months of AC (xarelto) in April 2019\par Keep uptodate with cancer screening\par Prophylactic DOACs not pursued due to his ongoing bleeding issues\par \par case and management discussed with Dr. Jack \par Labs in 3 months. CBC, CMP, ferritin (he usually does it locally with his PCP)\par Office visit few days later

## 2021-09-21 NOTE — HISTORY OF PRESENT ILLNESS
[de-identified] : Mr Young is a very pleasant 78 year old gentleman with HTN, HLD here to discuss management for her right LE DVT\par \par Around beginning of October- he felt as sharp pain righjt calf, no swelling, discomfort\par He went to a local physician - who did ultrasound- sent to [facility]- where he had repeat ultrasoud and was placed on xarelto \par Tolerated well\par \par He had a repeat ultrasound 1/31/19 which showed partially occlusive DVT of the right femoral and polpiteal veins with mild reduction of the venous return\par He was advised to stop xarelto 2/12/19\par \par He feels that past few days - he has started having the same pain right calf pain. No swelling\par \par \par He is retired. He goes to a [employer] program 5 days a week, swims 3 days a week\par He always sits cross legged, when not on a recliner\par Denies any long distance travel, surgeries, immobility prior to the event\par \par Has ho prostate cancer had radical prostatectomy [facility] ~ 5 years ago (9/2014).\par No DVT post surgery\par \par No personal or family ho DVT\par \par Colonoscopy (within last year) - Dr Lucien Aguillon - was normal \par \par \par Prothrombin gene mutation:\par HETEROZYGOUS\par INTERPRETATION:\par This patient is heterozygous for the Prothrombin [id number] mutation, and therefore\par at an increased risk(up to 3 fold) for thrombotic complications. However, the\par actual risk may be modified by synergistic interaction with other factors.\par Correlation with this patients clinical condition and/or with results of other\par relevant tests, and genetic counseling is suggested.\par \par Factor V leiden mutation, APS, protein S, AT3 def- negative\par \par Also saw Dr Martinez s/p EGD\par EGD:\par 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 Gastric polyp s/p biopsy\par 3. Irregular Z-line s/p biopsy\par \par Ferritin 80 (5/2020) dropped to 19.8 910/16/20)\par \par He had capsule endoscopy, MRI with Dr Lucien Aguillon (GI) and was told it was normal\par  [de-identified] : He is seen today for follow up\par Accompanied by his wife\par s/p Injectafer x 2 in April 21.\par \par Patient reports of doing well, eating and sleeping with no problems. \par Offers no complaints. \par Denies n/v, fever, headaches, dizziness, chest pain/palpitation, rash, bleeding, SOB/FLOWERS\par \par s/p  COVID Vaccine x 2\par \par

## 2021-09-21 NOTE — CONSULT LETTER
[Dear  ___] : Dear  [unfilled], [Courtesy Letter:] : I had the pleasure of seeing your patient, [unfilled], in my office today. [Please see my note below.] : Please see my note below. [Consult Closing:] : Thank you very much for allowing me to participate in the care of this patient.  If you have any questions, please do not hesitate to contact me. [Sincerely,] : Sincerely, [DrDariela  ___] : Dr. PRETTY [DrDariela ___] : Dr. PRETTY [FreeTextEntry3] : Rancho Jack MD, MPH\par Attending Physician\par Hematology Oncology\par [facility] [place]\par [facility]\par

## 2021-09-21 NOTE — RESULTS/DATA
[FreeTextEntry1] : Labs reviewed, analyzed and discussed\par \par 9/21/21 wbc 6.7 Hgb 17.0 hct 50.4 plts 128

## 2021-09-27 ENCOUNTER — APPOINTMENT (OUTPATIENT)
Dept: NEUROLOGY | Facility: CLINIC | Age: 81
End: 2021-09-27

## 2021-10-05 NOTE — REASON FOR VISIT
[facility] Note     CC: anxiety     S:Maral Snyder is a 12 year old female who presents with mother for anxiety, stress and menstrual irregularity    Pt present w/ mother  Anxiety - pt has hx of anxiety,and feels worried at times. States during episodes will persistently hand wash w/ fear of germs. Also feels anxious and worried with school. States she did not want to go to school and was having panic attacks. Pt was seeing pediatric psychologist earlier this year however stopped f/u  Has since restarted therapy with [facility] for the past 1 week and has been going well  States she has been going back to school and now enjoys school  Has friends at school  5th gradeDenies any bullying. Denies any thoughts si/self harm. No attempts. Lives at home w. Mother and does feel safe    Increase weight gain - pt has had significant amount of weight gain;   Mother states 3 meals with sncaks at night. States she may try and hide the snacks.  Mother states she has been avoiding bring snacks in to the home and trying to focus on healthier options  States she has been active and moving  Was refeerred to [facility] endo has not followed up or also seen nutritionist    Menstrual cycles age onset 13yo started in July  States this past 1 month September noticed episode of heavier menstrual cycle for the first time  Lasted 4 days  Has since stopped no hx of acne or hirsutism    ROS: as above  Eye Problem(s):negative  ENT Problem(s):negative  Cardiovascular problem(s):negative  Respiratory problem(s):negative  Gastro-intestinal problem(s):negative GI  Genito-urinary problem(s):negative  Musculoskeletal problem(s):negative  Integumentary problem(s):negative  Neurological problem(s):negative  Psychiatric problem(s):as above  Endocrine problem(s) as above  Hematologic and/or Lymphatic problem(s):negative    Current Outpatient Medications   Medication Sig Dispense Refill   • albuterol 108 (90 Base)  MCG/ACT inhaler Inhale 2 puffs into the lungs every 4 hours as needed for Shortness of Breath or Wheezing. 1 each 12   • albuterol 108 (90 Base) MCG/ACT inhaler Inhale 2 puffs into the lungs every 4 hours as needed for Shortness of Breath or Wheezing. 1 each 0   • ibuprofen (CHILDRENS ADVIL) 100 MG/5ML suspension TAKE 12.9 ML BY MOUTH EVERY 6 HOURS AS NEEDED FOR PAIN OR FEVER     • Acetaminophen Childrens 160 MG/5ML Suspension      • ibuprofen (Childrens Ibuprofen) 100 MG/5ML suspension Take 12.9 mLs by mouth every 6 hours as needed for Pain or Fever. 1 Bottle 1   • triamcinolone (ARISTOCORT) 0.1 % cream Apply topically 2 times daily. 15 g 1   • diphenhydrAMINE (M-Dryl) 12.5 MG/5ML liquid Take 5 mLs by mouth nightly as needed for Allergies. 1 Bottle 0   • fluticasone (FLONASE) 50 MCG/ACT nasal spray Spray 2 sprays in each nostril daily. 16 g 0   • ARNUITY ELLIPTA 50 MCG/ACT AEROSOL POWDER, BREATH ACTIVATED INHALE 1 PUFF INTO THE LUNGS DAILY 30 each 0     No current facility-administered medications for this visit.       No past medical history on file.    Social History     Socioeconomic History   • Marital status: Single     Spouse name: Not on file   • Number of children: Not on file   • Years of education: Not on file   • Highest education level: Not on file   Occupational History   • Not on file   Tobacco Use   • Smoking status: Never Smoker   • Smokeless tobacco: Never Used   Substance and Sexual Activity   • Alcohol use: Not on file   • Drug use: Not on file   • Sexual activity: Not on file   Other Topics Concern   • Not on file   Social History Narrative    Mom dad and dog.     Trixy      Social Determinants of Health     Financial Resource Strain:    • Social Determinants: Financial Resource Strain: Not on file   Food Insecurity:    • Social Determinants: Food Insecurity: Not on file   Transportation Needs:    • Lack of Transportation (Medical): Not on file   • Lack of Transportation (Non-Medical): Not on  file   Physical Activity:    • Days of Exercise per Week: Not on file   • Minutes of Exercise per Session: Not on file   Stress:    • Social Determinants: Stress: Not on file   Social Connections:    • Social Determinants: Social Connections: Not on file   Intimate Partner Violence:    • Social Determinants: Intimate Partner Violence Past Fear: Not on file   • Social Determinants: Intimate Partner Violence Current Fear: Not on file       Family History   Problem Relation Age of Onset   • Diabetes Mother    • Diabetes Father    • Aneurysm Maternal Aunt    • Diabetes Maternal Grandmother    • Aneurysm Paternal Grandfather    • Systemic Lupus Erythematosus Other        O:  Visit Vitals  /70 (BP Location: RUE - Right upper extremity, Patient Position: Sitting, Cuff Size: Regular)   Temp 97.4 °F (36.3 °C) (Tympanic)   Resp 22   Ht 4' 10\" (1.473 m)   Wt (!) 68.8 kg (151 lb 10.8 oz)   SpO2 100%   BMI 31.70 kg/m²     Gen: No Acute Distress  HEENT: NCAT, PERRLA, EOMI, MMM, neck supple, no masses, no LAD   Heart: RRR, nl S1/S2, no murmurs/rubs/gallops   Lung:Symmetrical expansion, CTAB, no wheezes/rales/ronchi  Abdomen: +BS, , soft, nt/nd, no masses, no guarding/rebounding  Skin: No rashes  Neuro: Grossly intact. Normal gait.     Labs:     WBC (K/mcL)   Date Value   06/29/2021 7.1     RBC (mil/mcL)   Date Value   06/29/2021 4.81     HCT (%)   Date Value   06/29/2021 42.1     HGB (g/dL)   Date Value   06/29/2021 13.4     PLT (K/mcL)   Date Value   06/29/2021 441       Sodium (mmol/L)   Date Value   06/29/2021 139     Potassium (mmol/L)   Date Value   06/29/2021 4.6     Chloride (mmol/L)   Date Value   06/29/2021 107     Carbon Dioxide (mmol/L)   Date Value   06/29/2021 24     BUN (mg/dL)   Date Value   06/29/2021 6     Creatinine (mg/dL)   Date Value   06/29/2021 0.25 (L)       Hemoglobin A1C (%)   Date Value   10/26/2018 5.4       Cholesterol (mg/dL)   Date Value   06/29/2021 149     HDL (mg/dL)   Date Value    06/29/2021 46     Cholesterol/ HDL Ratio (no units)   Date Value   06/29/2021 3.2     Triglycerides (mg/dL)   Date Value   06/29/2021 124 (H)     LDL (mg/dL)   Date Value   06/29/2021 78       TSH (mcUnits/mL)   Date Value   06/29/2021 1.166     A/P: Maral Snyder is a 12 year old female who presents with mother for anxiety, stress and menstrual irregularity    Anxiety disorder, unspecified type  (primary encounter diagnosis)  Comment: states she started therapy with school feeing better  Denies any thoughts sis/elf harm mother supportive   Safe environment  Plan  SERVICE TO PEDIATRIC PSYCH & COUNSELING,   Also in therapy   Counseling on anxiety/stress reduction. Resources provided  Educational hand out  If any worsening symptoms or no improvement needs f/u    BMI (body mass index), pediatric, > 99% for age  Menstrual irregularity  Comment: did note episode of heavy menstrual cycles;; discussed can be nl for 1-2 years with age on onset of menstrual cycles/ immature hypothal-pit-ovarian axis. However also significant weight gain and will also check labs hormone labs etc.   Plan: CBC WITH DIFFERENTIAL, COMPREHENSIVE METABOLIC PANEL, GLYCOHEMOGLOBIN, THYROID STIMULATING HORMONE, LIPID PANEL WITH REFLEX LUTENIZING HORMONE, FOLLICLE STIMULATING HORMONE, TESTOSTERONE, FREE, SEX BINDING GLOBULIN, AND TOTAL, FEMALE OR CHILD,   PROLACTIN, DHEA SULFATE  Weight loss counseling discussed in detail did not f/u with peds endo or nutrition  SERVICE TO PEDIATRIC ENDOCRINOLOGY, SERVICE TO NUTRITION COUNSELING   5-2-1 plan:  - 5 servings per day of fruits and vegetables (emphasis on the latter)  - <2 hours per day of sedentary activity such as television, computer and others  - ?1 hour per day of physical activity, if possible combining aerobic activity with strength training  Other nutritional guidance:   1. Eat all meals off of a salad plate, not a dinner plate. Visit choosemyplate.gov to see recommended portion sizes.              Half of the plate should be produce (more vegetables, some fruit).             The other half of the plate should be whole grains and some protein.  2. Focus on fiber and protein intake at all meals and snacks.  3. Do not snack straight out of the box or bag, separate out 1 portion before eating.  4. Eat 3 meals and only 2 snacks a day. No snack after dinner. Be sure to eat a healthy breakfast.  5. NO sugar sweetened beverages, except on special occasions.  6. Limit fast food to once every other week.    Need for immunization against influenza  Comment: due  Plan: INFLUENZA QUADRIVALENT SPLIT PRES FREE 0.5 ML   VACC, IM (FLULAVAL,FLUARIX,FLUZONE)  Educations pt on vaccine schedule, its benefits, risks and potential side effects.     Follow up in 1 month sooner prn  Instructions provided as documented in the Visit Summary.    Medical compliance with plan discussed and risk of noncompliance reviewed.    Patient education completed on disease process, etiology and prognosis.    Return to clinic as clinically indicated was discussed with patient who verbalized understanding of and agreement with the plan.    Proper usage and all side effects of medications reviewed with patient who expressed understanding.     Sakshi Dobbins MD  Family Medicine Physician      [Follow-Up Visit] : a follow-up visit for [FreeTextEntry2] : DVT

## 2021-11-15 ENCOUNTER — APPOINTMENT (OUTPATIENT)
Dept: CARDIOLOGY | Facility: CLINIC | Age: 81
End: 2021-11-15
Payer: MEDICARE

## 2021-11-15 ENCOUNTER — NON-APPOINTMENT (OUTPATIENT)
Age: 81
End: 2021-11-15

## 2021-11-15 VITALS
BODY MASS INDEX: 24.13 KG/M2 | HEIGHT: 73.98 IN | SYSTOLIC BLOOD PRESSURE: 130 MMHG | HEART RATE: 55 BPM | WEIGHT: 188 LBS | TEMPERATURE: 98.1 F | DIASTOLIC BLOOD PRESSURE: 70 MMHG

## 2021-11-15 PROCEDURE — 93000 ELECTROCARDIOGRAM COMPLETE: CPT

## 2021-11-15 PROCEDURE — 99213 OFFICE O/P EST LOW 20 MIN: CPT

## 2021-11-15 NOTE — DISCUSSION/SUMMARY
[FreeTextEntry1] : The patient is doing extremely well clinically. There have been no cardiac symptoms. When the patient was originally referred to me he had a diagnosis of "a previous heart attack". However I was never able to confirm that diagnosis.\par Recently his cardiac status is stable. His examination is normal the electrocardiograms reveal sinus rhythm borderline first degree AV block right bundle branch block with no acute changes\par \par There has been no recurrence of DVT.\par The patient has been treated for depression in the past and this has been in complete remission.\par \par The patient will be undergoing evaluation for what sounds like a lumbar radiculopathy shortly with a neurologist.\par I am electing to continue the current medications at this time\par \par

## 2021-11-17 ENCOUNTER — APPOINTMENT (OUTPATIENT)
Dept: NEUROLOGY | Facility: CLINIC | Age: 81
End: 2021-11-17
Payer: MEDICARE

## 2021-11-17 PROCEDURE — 95886 MUSC TEST DONE W/N TEST COMP: CPT

## 2021-11-17 PROCEDURE — 95911 NRV CNDJ TEST 9-10 STUDIES: CPT

## 2021-11-29 ENCOUNTER — APPOINTMENT (OUTPATIENT)
Dept: NEUROLOGY | Facility: CLINIC | Age: 81
End: 2021-11-29
Payer: MEDICARE

## 2021-11-29 VITALS
SYSTOLIC BLOOD PRESSURE: 135 MMHG | HEIGHT: 75 IN | BODY MASS INDEX: 23.38 KG/M2 | TEMPERATURE: 98.2 F | WEIGHT: 188 LBS | DIASTOLIC BLOOD PRESSURE: 65 MMHG | HEART RATE: 58 BPM

## 2021-11-29 PROCEDURE — 99214 OFFICE O/P EST MOD 30 MIN: CPT

## 2021-11-29 RX ORDER — PROMETHAZINE HYDROCHLORIDE AND DEXTROMETHORPHAN HYDROBROMIDE ORAL SOLUTION 15; 6.25 MG/5ML; MG/5ML
6.25-15 SOLUTION ORAL
Qty: 200 | Refills: 0 | Status: DISCONTINUED | COMMUNITY
Start: 2021-10-18

## 2021-11-29 RX ORDER — AZITHROMYCIN 250 MG/1
250 TABLET, FILM COATED ORAL
Qty: 6 | Refills: 0 | Status: DISCONTINUED | COMMUNITY
Start: 2021-10-18

## 2021-11-29 RX ORDER — DICLOFENAC SODIUM 1% 10 MG/G
1 GEL TOPICAL
Qty: 100 | Refills: 0 | Status: DISCONTINUED | COMMUNITY
Start: 2021-07-21

## 2021-11-29 NOTE — ASSESSMENT
[FreeTextEntry1] : This is a 81-year-old man with multiple issues:\par \par The right thigh and foot pain are musculoskeletal in nature.  There is no evidence clinically or electrophysiologically of a lumbosacral radiculopathy.\par \par Memory changes - which may be related to depression and normal aging.\par MRI brain. Pt declined.\par Neuropsychological evaluation. Pt. declined.\par Follow over time. 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   It is not painful.  There is no indication for any medication for neuropathic pain at this time.\par Possible demyelinating component on EMG which can be seen with diabetic neuropathy- there is no evidence of CIDP clinically on exam. \par Daily foot exams\par He already sees a podiatrist\par EMG of legs resluts reviewed.\par \par Follow up in 6 months with Dr. Black. \par \par Case and management discussed with Dr. Cruz. \par I discussed in detail with the patient and family the diagnosis, prognosis, treatment plan and answered all of their questions.\par

## 2021-11-29 NOTE — REVIEW OF SYSTEMS
[Joint Pain] : joint pain [Joint Stiffness] : joint stiffness [Negative] : Genitourinary [Fever] : no fever [Chills] : no chills [Feeling Poorly] : not feeling poorly [Feeling Tired] : not feeling tired [Anxiety] : no anxiety [Depression] : no depression [Numbness] : no numbness [Tingling] : no tingling [Dizziness] : no dizziness [Vertigo] : no vertigo [Tension Headache] : no tension-type headache [Eye Pain] : no eye pain [Red Eyes] : eyes not red [Loss Of Hearing] : no hearing loss [Chest Pain] : no chest pain [Palpitations] : no palpitations [Shortness Of Breath] : no shortness of breath [Wheezing] : no wheezing [Cough] : no cough [Incontinence] : no incontinence [Joint Swelling] : no joint swelling [Itching] : no itching [Muscle Weakness] : no muscle weakness [Easy Bleeding] : no tendency for easy bleeding [Easy Bruising] : no tendency for easy bruising [Swollen Glands] : no swollen glands

## 2021-11-29 NOTE — HISTORY OF PRESENT ILLNESS
[FreeTextEntry1] : History obtained from patient and wife.\par \par Emigdio Young is a 81 year old man with a history of type 2 diabetes, DVT, RBBB, dyslipidemia, presenting for a follow up appointment to review his EMG results.  \par \par He has not had any decline in memory since his last visit. He did not complete the MRI or neuropsychological evaluation previously ordered. As per his wife, he is able to function but occasionally will forget some names. \par \par He continues to have intermediate sharp pain in his right medial thigh. He will have occasional numbness in his lower legs and feet. Denies any worsening of symptoms or weakness. He is monitoring his glucose with his PCP.\par \par The remaining neurological review of systems is negative. \par \par 8/26/21 \par This is a 81-year-old man with several months of pain.  He has 1 very localized sharp pain in the medial thigh.  There is tenderness there.  He also has pain in the medial foot in the arch which sometimes radiates to the big toe.  He has occasional feeling of numbness in both feet.  He is not sure how long this has been.  He never had any low back pain and no radiating pain in the legs.  He has no urinary problems.  He has problems with constipation.\par The pain is better if he is moving around.  There is tenderness in the medial thigh and medial foot.\par He has had diabetes mellitus for 1 to 2 years.  He has Dupuytren's contracture, bilaterally.\par He has a history of alcohol abuse and has been a member of Alcoholics Anonymous for 45 years.  He has not drank alcohol for decades.\par He has a history of depression, he has had ECT in the distant past.\par \par He says that he has slight short-term memory problems but he feels this is due to impaired attention.  He is able to perform all of his IADLs.

## 2021-11-29 NOTE — CONSULT LETTER
[Dear  ___] : Dear  [unfilled], [Consult Letter:] : I had the pleasure of evaluating your patient, [unfilled]. [Consult Closing:] : Thank you very much for allowing me to participate in the care of this patient.  If you have any questions, please do not hesitate to contact me. [Sincerely,] : Sincerely, [DrDariela  ___] : Dr. PRETTY [FreeTextEntry3] : Nina Cruz M.D.

## 2021-11-29 NOTE — PHYSICAL EXAM
[FreeTextEntry1] : Physical examination \par General: No acute distress, Awake, Alert.   \par \par Mental status \par MoCA\par 8/26/2021 - 20/30; memory 1/5\par \par Cranial Nerves \par II: VFF  \par III, IV, VI: PERRL, EOMI.   \par V: Facial sensation is normal B/L.   \par VII: Facial strength is normal B/L. \par \par \par VIII: Decreased hearing, bilaterally. \par \par IX, X: Palate is midline and elevates symmetrically.   \par XI: Trapezius normal strength.   \par XII: Tongue midline without atrophy or fasciculations. \par \par Motor exam  \par Muscle tone -mild cogwheel rigidity in both arms.\par Tremor with action and posture: High-frequency, low amplitude..  \par No atrophy or fasciculations \par Muscle Strength: arms and legs, proximal and distal flexors and extensors are normal \par \par No UE drift.\par \par Reflexes \par All present, normal, and symmetrical \par Plantars right: mute.   \par Plantars left: mute.   \par \par Coordination \par Slow, no ataxia, tremor with action- FNF, DONALD, HKS. \par \par Sensory \par Decreased sensation to MM B.  Stocking distribution decreased sensation to pinprick MCC up the lower leg.  Reduced distal sensation to cold and vibration. \par \par Gait \par He is able to walk on his heels and toes and do a few steps with tandem gait.  \par Slow, wide based gait. \par \par

## 2021-11-29 NOTE — DATA REVIEWED
[de-identified] : 11/17/21\par EMG legs-there is electrophysiologic evidence of an axonal sensorimotor polyneuropathy involving the legs with a possible demyelinating component. There is no electrophysiologic evidence of a right lumbosacral radiculopathy involving the motor axons.

## 2021-12-22 ENCOUNTER — APPOINTMENT (OUTPATIENT)
Dept: HEMATOLOGY ONCOLOGY | Facility: CLINIC | Age: 81
End: 2021-12-22
Payer: MEDICARE

## 2021-12-22 VITALS
OXYGEN SATURATION: 93 % | TEMPERATURE: 98.2 F | DIASTOLIC BLOOD PRESSURE: 77 MMHG | BODY MASS INDEX: 24.51 KG/M2 | SYSTOLIC BLOOD PRESSURE: 147 MMHG | WEIGHT: 197.13 LBS | HEIGHT: 75 IN | RESPIRATION RATE: 16 BRPM | HEART RATE: 62 BPM

## 2021-12-22 PROCEDURE — 99214 OFFICE O/P EST MOD 30 MIN: CPT

## 2021-12-22 NOTE — ASSESSMENT
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 \par He's feeling well with no complaints.\par To repeat labs locally in 4-6 weeks and reach out to us to go over results. To give IV Iron PRN.\par Advised to follow up with GI Dr. Aguillon. \par \par Right LE DVT\par Heterozygous prothrombin gene mutation\par Likely unprovoked\par He reports that he had an extensive occlusive right LE DVT- started having symptoms again since stopping xarelto\par Completed 6 months of AC (xarelto) in April 2019\par Keep uptodate with cancer screening\par Prophylactic DOACs not pursued due to his ongoing bleeding issues\par \par Labs in 3 months. CBC, CMP, ferritin \par Office visit few days later

## 2021-12-22 NOTE — CONSULT LETTER
[FreeTextEntry3] : Rancho Jack MD, MPH\par Attending Physician\par Hematology Oncology\par [facility] [place]\par [facility]\par

## 2021-12-22 NOTE — HISTORY OF PRESENT ILLNESS
[de-identified] : Mr Young is a very pleasant 78 year old gentleman with HTN, HLD here to discuss management for her right LE DVT\par \par Around beginning of October- he felt as sharp pain righjt calf, no swelling, discomfort\par He went to a local physician - who did ultrasound- sent to [facility]- where he had repeat ultrasoud and was placed on xarelto \par Tolerated well\par \par He had a repeat ultrasound 1/31/19 which showed partially occlusive DVT of the right femoral and polpiteal veins with mild reduction of the venous return\par He was advised to stop xarelto 2/12/19\par \par He feels that past few days - he has started having the same pain right calf pain. No swelling\par \par \par He is retired. He goes to a residential program 5 days a week, swims 3 days a week\par He always sits cross legged, when not on a recliner\par Denies any long distance travel, surgeries, immobility prior to the event\par \par Has ho prostate cancer had radical prostatectomy [facility] ~ 5 years ago (9/2014).\par No DVT post surgery\par \par No personal or family ho DVT\par \par Colonoscopy (within last year) - Dr Lucien Aguillon - was normal \par \par \par Prothrombin gene mutation:\par HETEROZYGOUS\par INTERPRETATION:\par This patient is heterozygous for the Prothrombin [id number] mutation, and therefore\par at an increased risk(up to 3 fold) for thrombotic complications. However, the\par actual risk may be modified by synergistic interaction with other factors.\par Correlation with this patients clinical condition and/or with results of other\par relevant tests, and genetic counseling is suggested.\par \par Factor V leiden mutation, APS, protein S, AT3 def- negative\par \par Also saw Dr Martinez s/p EGD\par EGD:\par 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 Gastric polyp s/p biopsy\par 3. Irregular Z-line s/p biopsy\par \par Ferritin 80 (5/2020) dropped to 19.8 910/16/20)\par \par He had capsule endoscopy, MRI with Dr Lucien Aguillon (GI) and was told it was normal\par  [de-identified] : He is seen today for follow up\par Accompanied by his wife (Wendy)\par s/p Injectafer x 2 in April 21.\par \par Patient reports of doing well, eating and sleeping with no problems. \par Started to swim again\par Received Booster dose of his COVID\par \par s/p Pfizer COVID Vaccine x 3\par \par He has his blood work done with [facility] (12/14/21)\par No CBC. Ferritin 95

## 2022-03-01 ENCOUNTER — APPOINTMENT (OUTPATIENT)
Dept: ENDOCRINOLOGY | Facility: CLINIC | Age: 82
End: 2022-03-01
Payer: MEDICARE

## 2022-03-01 VITALS
SYSTOLIC BLOOD PRESSURE: 135 MMHG | OXYGEN SATURATION: 96 % | HEART RATE: 59 BPM | HEIGHT: 75 IN | WEIGHT: 195 LBS | BODY MASS INDEX: 24.25 KG/M2 | DIASTOLIC BLOOD PRESSURE: 70 MMHG

## 2022-03-01 PROCEDURE — 99203 OFFICE O/P NEW LOW 30 MIN: CPT

## 2022-03-01 RX ORDER — LANCETS 33 GAUGE
EACH MISCELLANEOUS
Qty: 100 | Refills: 3 | Status: ACTIVE | COMMUNITY
Start: 2022-03-01 | End: 1900-01-01

## 2022-03-01 RX ORDER — BLOOD SUGAR DIAGNOSTIC
STRIP MISCELLANEOUS
Qty: 100 | Refills: 3 | Status: ACTIVE | COMMUNITY
Start: 2022-03-01 | End: 1900-01-01

## 2022-03-10 ENCOUNTER — APPOINTMENT (OUTPATIENT)
Dept: OPHTHALMOLOGY | Facility: CLINIC | Age: 82
End: 2022-03-10
Payer: MEDICARE

## 2022-03-10 ENCOUNTER — NON-APPOINTMENT (OUTPATIENT)
Age: 82
End: 2022-03-10

## 2022-03-10 PROCEDURE — 92014 COMPRE OPH EXAM EST PT 1/>: CPT

## 2022-03-23 ENCOUNTER — APPOINTMENT (OUTPATIENT)
Dept: HEMATOLOGY ONCOLOGY | Facility: CLINIC | Age: 82
End: 2022-03-23

## 2022-03-23 ENCOUNTER — RESULT REVIEW (OUTPATIENT)
Age: 82
End: 2022-03-23

## 2022-03-23 VITALS
RESPIRATION RATE: 18 BRPM | SYSTOLIC BLOOD PRESSURE: 142 MMHG | OXYGEN SATURATION: 95 % | DIASTOLIC BLOOD PRESSURE: 70 MMHG | HEIGHT: 75 IN | BODY MASS INDEX: 24.41 KG/M2 | TEMPERATURE: 97.3 F | WEIGHT: 196.3 LBS | HEART RATE: 55 BPM

## 2022-03-30 ENCOUNTER — RESULT REVIEW (OUTPATIENT)
Age: 82
End: 2022-03-30

## 2022-03-30 ENCOUNTER — APPOINTMENT (OUTPATIENT)
Dept: HEMATOLOGY ONCOLOGY | Facility: CLINIC | Age: 82
End: 2022-03-30
Payer: MEDICARE

## 2022-03-30 VITALS
OXYGEN SATURATION: 95 % | HEIGHT: 75 IN | SYSTOLIC BLOOD PRESSURE: 139 MMHG | TEMPERATURE: 98.2 F | WEIGHT: 197 LBS | RESPIRATION RATE: 16 BRPM | HEART RATE: 59 BPM | BODY MASS INDEX: 24.49 KG/M2 | DIASTOLIC BLOOD PRESSURE: 64 MMHG

## 2022-03-30 PROCEDURE — 99214 OFFICE O/P EST MOD 30 MIN: CPT | Mod: 25

## 2022-03-30 NOTE — CONSULT LETTER
[Dear  ___] : Dear  [unfilled], [Courtesy Letter:] : I had the pleasure of seeing your patient, [unfilled], in my office today. [Please see my note below.] : Please see my note below. [Consult Closing:] : Thank you very much for allowing me to participate in the care of this patient.  If you have any questions, please do not hesitate to contact me. [Sincerely,] : Sincerely, [DrDariela  ___] : Dr. PRETTY [DrDariela ___] : Dr. PRETTY [FreeTextEntry3] : Rancho Jack MD, MPH\par Attending Physician\par Hematology Oncology\par [facility] [place]\par [facility]\par

## 2022-03-30 NOTE — ASSESSMENT
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 Aguillon.\par Ferritin declining. HOld IV iron until work up for erythrocytosis complete \par \par Right LE DVT\par Heterozygous prothrombin gene mutation\par Likely unprovoked\par He reports that he had an extensive occlusive right LE DVT- started having symptoms again since stopping xarelto\par Completed 6 months of AC (xarelto) in April 2019\par Keep uptodate with cancer screening\par Prophylactic DOACs not pursued due to his ongoing bleeding issues\par \par FU in a few weeks\par No labs

## 2022-03-30 NOTE — HISTORY OF PRESENT ILLNESS
[de-identified] : Mr Young is a very pleasant 78 year old gentleman with HTN, HLD here to discuss management for her right LE DVT\par \par Around beginning of October- he felt as sharp pain righjt calf, no swelling, discomfort\par He went to a local physician - who did ultrasound- sent to [facility]- where he had repeat ultrasoud and was placed on xarelto \par Tolerated well\par \par He had a repeat ultrasound 1/31/19 which showed partially occlusive DVT of the right femoral and polpiteal veins with mild reduction of the venous return\par He was advised to stop xarelto 2/12/19\par \par He feels that past few days - he has started having the same pain right calf pain. No swelling\par \par \par He is retired. 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 However, the\par actual risk may be modified by synergistic interaction with other factors.\par Correlation with this patients clinical condition and/or with results of other\par relevant tests, and genetic counseling is suggested.\par \par Factor V leiden mutation, APS, protein S, AT3 def- negative\par \par Also saw Dr Martinez s/p EGD\par EGD:\par 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 Gastric polyp s/p biopsy\par 3. Irregular Z-line s/p biopsy\par \par Ferritin 80 (5/2020) dropped to 19.8 910/16/20)\par \par He had capsule endoscopy, MRI with Dr Lucien Aguillon (GI) and was told it was normal\par \par He has his blood work done with [facility] (12/14/21)\par No CBC. Ferritin 95 [de-identified] : He is seen today for follow up\par Accompanied by his wife (Wendy)\par s/p Pfizer COVID Vaccine x 3\par \par He has fluctuating pains in the right thing, left calf, right ankle. Lasts for a few hours and then resolved\par

## 2022-04-08 ENCOUNTER — APPOINTMENT (OUTPATIENT)
Dept: HEMATOLOGY ONCOLOGY | Facility: CLINIC | Age: 82
End: 2022-04-08
Payer: MEDICARE

## 2022-04-08 VITALS
SYSTOLIC BLOOD PRESSURE: 134 MMHG | OXYGEN SATURATION: 96 % | RESPIRATION RATE: 16 BRPM | HEIGHT: 75 IN | WEIGHT: 196 LBS | TEMPERATURE: 97.4 F | BODY MASS INDEX: 24.37 KG/M2 | HEART RATE: 73 BPM | DIASTOLIC BLOOD PRESSURE: 55 MMHG

## 2022-04-08 PROCEDURE — 99214 OFFICE O/P EST MOD 30 MIN: CPT | Mod: 25

## 2022-04-08 NOTE — CONSULT LETTER
[Dear  ___] : Dear  [unfilled], [Courtesy Letter:] : I had the pleasure of seeing your patient, [unfilled], in my office today. [Please see my note below.] : Please see my note below. [Consult Closing:] : Thank you very much for allowing me to participate in the care of this patient.  If you have any questions, please do not hesitate to contact me. [Sincerely,] : Sincerely, [DrDariela  ___] : Dr. PRETTY [DrDariela ___] : Dr. PRETTY [FreeTextEntry3] : Rancho Jack MD, MPH\par Attending Physician\par Hematology Oncology\par [facility] [place]\par [facility]\par

## 2022-04-08 NOTE — ASSESSMENT
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 clinically silent\par Advised to obtain sleep studies. 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\par Discussed at length about the findings, implications, treatment\par Advised to have her blood relatives tested\par Information given to the patient in the form of [email or web address] website and reading material\par Discussed about the low iron diet and treatment options including periodic phlebotomy, iron chelator\par Liver biopsy is the best test to assess iron content and to rule out liver damage including cirrhosis\par Other option is MRI liver, which can estimate iron content, but is not as sensitive to look for cirrhosis\par Target Ferritin <  and iron saturation < 45%\par \par Iron deficiency anemia\par Likely from GI blood loss\par Had EGD and capsule endoscopy with Dr Martinez\par June 2019 EGD showed 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 Aguillon.\par Ferritin declining. \par No further iV iron given erythrocytosis and ?hemochromatosis\par \par Right LE DVT\par Heterozygous prothrombin gene mutation\par Likely unprovoked\par He reports that he had an extensive occlusive right LE DVT- started having symptoms again since stopping xarelto\par Completed 6 months of AC (xarelto) in April 2019\par Keep uptodate with cancer screening\par Prophylactic DOACs not pursued due to his ongoing bleeding issues\par \par Labs in 3 months\par CBC, CMP iron studies, ferritin, erythropoietin, AFP, GGT\par OV few days later

## 2022-04-08 NOTE — HISTORY OF PRESENT ILLNESS
[de-identified] : Mr Young is a very pleasant 78 year old gentleman with HTN, HLD here to discuss management for her right LE DVT\par \par Around beginning of October- he felt as sharp pain righjt calf, no swelling, discomfort\par He went to a local physician - who did ultrasound- sent to [facility]- where he had repeat ultrasoud and was placed on xarelto \par Tolerated well\par \par He had a repeat ultrasound 1/31/19 which showed partially occlusive DVT of the right femoral and polpiteal veins with mild reduction of the venous return\par He was advised to stop xarelto 2/12/19\par \par He feels that past few days - he has started having the same pain right calf pain. 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, swims 3 days a week\par He always sits cross legged, when not on a recliner\par Denies any long distance travel, surgeries, immobility prior to the event\par \par Has ho prostate cancer had radical prostatectomy [facility] ~ 5 years ago (9/2014).\par No DVT post surgery\par \par No personal or family ho DVT\par \par Colonoscopy (within last year) - Dr Lucien Aguillon - was normal \par \par \par Prothrombin gene mutation:\par HETEROZYGOUS\par INTERPRETATION:\par This patient is heterozygous for the Prothrombin [id number] mutation, and therefore\par at an increased risk(up to 3 fold) for thrombotic complications. However, the\par actual risk may be modified by synergistic interaction with other factors.\par Correlation with this patients clinical condition and/or with results of other\par relevant tests, and genetic counseling is suggested.\par \par Factor V leiden mutation, APS, protein S, AT3 def- negative\par \par Also saw Dr Martinez s/p EGD\par EGD:\par 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 Gastric polyp s/p biopsy\par 3. Irregular Z-line s/p biopsy\par \par Ferritin 80 (5/2020) dropped to 19.8 910/16/20)\par \par He had capsule endoscopy, MRI with Dr Lucien Aguillon (GI) and was told it was normal\par \par He has his blood work done with [facility] (12/14/21)\par No CBC. Ferritin 95 [de-identified] : He is seen today for follow up\par Accompanied by his wife (Wendy)\par s/p Pfizer COVID Vaccine x 3\par \par He has fluctuating pains in the right thing, left calf, right ankle. Lasts for a few hours and then resolved\par Xray shows no fractures

## 2022-04-11 PROBLEM — Z11.59 SCREENING FOR VIRAL DISEASE: Status: ACTIVE | Noted: 2021-03-17

## 2022-05-17 ENCOUNTER — NON-APPOINTMENT (OUTPATIENT)
Age: 82
End: 2022-05-17

## 2022-05-17 ENCOUNTER — APPOINTMENT (OUTPATIENT)
Dept: CARDIOLOGY | Facility: CLINIC | Age: 82
End: 2022-05-17
Payer: MEDICARE

## 2022-05-17 VITALS
OXYGEN SATURATION: 94 % | BODY MASS INDEX: 23.75 KG/M2 | SYSTOLIC BLOOD PRESSURE: 130 MMHG | WEIGHT: 191 LBS | HEIGHT: 75 IN | HEART RATE: 63 BPM | DIASTOLIC BLOOD PRESSURE: 70 MMHG

## 2022-05-17 PROCEDURE — 99214 OFFICE O/P EST MOD 30 MIN: CPT

## 2022-05-17 PROCEDURE — 93246 EXT ECG>7D<15D RECORDING: CPT

## 2022-05-17 PROCEDURE — 93000 ELECTROCARDIOGRAM COMPLETE: CPT

## 2022-05-17 NOTE — REASON FOR VISIT
[FreeTextEntry1] : Returns for follow-up today.  There is a previous history of chest discomfort which has now completely resolved over the past number of years.  The patient is treated for diabetes mellitus with low-dose metformin as well as Farxiga.  Also the patient has been treated with thyroid replacement therapy Cytomel which has given him a slightly low T4 but normal TSH.  The patient's TSH is 1.87 within normal limits.  The patient also has had intermittent pain in the lower extremities especially of the right lower extremity has been diagnosed as a form of neuropathy by his neurologist.

## 2022-05-17 NOTE — DISCUSSION/SUMMARY
[FreeTextEntry1] : Patient's cardiac status on examination is stable cardiorespiratory examination is normal the electrocardiograph reveals sinus rhythm low voltage first-degree AV block right bundle branch block.  With no acute changes.  In order to evaluate his recent symptoms of lightheadedness and irregular pulse I am placing a cardiac event recorder.  We will analyze these results and make any changes if necessary in his medications\par \par I have asked him to check with his neurologist as to whether any treatment for neuropathy would be indicated.\par I have encouraged the patient to continue his program of exercise especially swimming.  We will do a treadmill test in 6 months.

## 2022-05-17 NOTE — HISTORY OF PRESENT ILLNESS
[FreeTextEntry1] : The patient describes 2 episodes where he felt an irregularity of the heartbeat accompanied by dizziness and lightheadedness.  There was no chest discomfort or shortness of breath the episodes resolve spontaneously within a matter of about 10 minutes.

## 2022-05-31 ENCOUNTER — NON-APPOINTMENT (OUTPATIENT)
Age: 82
End: 2022-05-31

## 2022-05-31 ENCOUNTER — APPOINTMENT (OUTPATIENT)
Dept: NEUROLOGY | Facility: CLINIC | Age: 82
End: 2022-05-31
Payer: MEDICARE

## 2022-05-31 VITALS
BODY MASS INDEX: 24.51 KG/M2 | OXYGEN SATURATION: 94 % | WEIGHT: 191 LBS | HEART RATE: 63 BPM | SYSTOLIC BLOOD PRESSURE: 138 MMHG | DIASTOLIC BLOOD PRESSURE: 64 MMHG | HEIGHT: 74 IN | TEMPERATURE: 98.2 F

## 2022-05-31 DIAGNOSIS — H91.93 UNSPECIFIED HEARING LOSS, BILATERAL: ICD-10-CM

## 2022-05-31 PROCEDURE — 99215 OFFICE O/P EST HI 40 MIN: CPT

## 2022-05-31 RX ORDER — FLUTICASONE FUROATE, UMECLIDINIUM BROMIDE AND VILANTEROL TRIFENATATE 100; 62.5; 25 UG/1; UG/1; UG/1
100-62.5-25 POWDER RESPIRATORY (INHALATION)
Qty: 60 | Refills: 0 | Status: DISCONTINUED | COMMUNITY
Start: 2021-11-10 | End: 2022-05-31

## 2022-06-02 NOTE — HISTORY OF PRESENT ILLNESS
[FreeTextEntry1] : Emigdio Young is a 81 year old man with a history of type 2 diabetes, DVT, RBBB, dyslipidemia. He is establishing care with me.\par \par He notes tingling under arm pits and right sided tingling in his feet.\par No loss of balance\par no loss of strength- swims frequently.\par \par Mirapex was decreased from 4 to 3 tab\par psychopharmacologist reduced it\par This is to help with depression apparently.\par depression - ECT - \par \par A1C 7\par \par Memory complaints are stable.\par \par \par 11/29/21- HPI from Dr. Cruz and Skye Rogers NP\par History obtained from patient and wife.\par \par Emigdio Young is a 81 year old man with a history of type 2 diabetes, DVT, RBBB, dyslipidemia, presenting for a follow up appointment to review his EMG results.  \par \par He has not had any decline in memory since his last visit. He did not complete the MRI or neuropsychological evaluation previously ordered. As per his wife, he is able to function but occasionally will forget some names. \par \par He continues to have intermediate sharp pain in his right medial thigh. He will have occasional numbness in his lower legs and feet. Denies any worsening of symptoms or weakness. He is monitoring his glucose with his PCP.\par \par The remaining neurological review of systems is negative. \par \par 8/26/21 \par This is a 81-year-old man with several months of pain.  He has 1 very localized sharp pain in the medial thigh.  There is tenderness there.  He also has pain in the medial foot in the arch which sometimes radiates to the big toe.  He has occasional feeling of numbness in both feet.  He is not sure how long this has been.  He never had any low back pain and no radiating pain in the legs.  He has no urinary problems.  He has problems with constipation.\par The pain is better if he is moving around.  There is tenderness in the medial thigh and medial foot.\par He has had diabetes mellitus for 1 to 2 years.  He has Dupuytren's contracture, bilaterally.\par He has a history of alcohol abuse and has been a member of [employer] for 45 years.  He has not drank alcohol for decades.\par He has a history of depression, he has had ECT in the distant past.\par \par He says that he has slight short-term memory problems but he feels this is due to impaired attention.  He is able to perform all of his IADLs.

## 2022-06-02 NOTE — CONSULT LETTER
[Dear  ___] : Dear  [unfilled], [Consult Letter:] : I had the pleasure of evaluating your patient, [unfilled]. [Consult Closing:] : Thank you very much for allowing me to participate in the care of this patient.  If you have any questions, please do not hesitate to contact me. [DrDariela  ___] : Dr. PRETTY [Please see my note below.] : Please see my note below. [FreeTextEntry3] : Sincerely,\par \par Micaela Black M.D.\par

## 2022-06-02 NOTE — ASSESSMENT
[FreeTextEntry1] : This is a 81-year-old man with multiple issues:\par \par For neuropathy- recommended tight control of glucose. He is not in pain so will monitor.\par \par Memory changes- stable- At present monitor.\par \par \par \par Follow up in 6 months with Dr. Black. \par \par

## 2022-06-02 NOTE — DATA REVIEWED
[de-identified] : 11/17/21\par EMG legs-there is electrophysiologic evidence of an axonal sensorimotor polyneuropathy involving the legs with a possible demyelinating component. There is no electrophysiologic evidence of a right lumbosacral radiculopathy involving the motor axons.

## 2022-06-02 NOTE — REVIEW OF SYSTEMS
[Confused or Disoriented] : confusion [Joint Pain] : joint pain [Joint Stiffness] : joint stiffness [Negative] : Genitourinary [Fever] : no fever [Chills] : no chills [Feeling Poorly] : not feeling poorly [Feeling Tired] : not feeling tired [Anxiety] : no anxiety [Depression] : no depression [Numbness] : no numbness [Tingling] : no tingling [Seizures] : no convulsions [Dizziness] : no dizziness [Vertigo] : no vertigo [Tension Headache] : no tension-type headache [Difficulty Walking] : no difficulty walking [Eye Pain] : no eye pain [Red Eyes] : eyes not red [Loss Of Hearing] : no hearing loss [Sore Throat] : no sore throat [Chest Pain] : no chest pain [Palpitations] : no palpitations [Shortness Of Breath] : no shortness of breath [Wheezing] : no wheezing [Cough] : no cough [Constipation] : no constipation [Diarrhea] : no diarrhea [Incontinence] : no incontinence [Joint Swelling] : no joint swelling [Skin Wound] : no skin wound [Itching] : no itching [Muscle Weakness] : no muscle weakness [Easy Bleeding] : no tendency for easy bleeding [Easy Bruising] : no tendency for easy bruising [Swollen Glands] : no swollen glands

## 2022-06-02 NOTE — PHYSICAL EXAM
[FreeTextEntry1] : Physical examination \par General: No acute distress, Awake, Alert.   \par \par Mental status \par Awake, alert, oriented. normal language and concentration. normal insight.\par \par Cranial Nerves \par II: VFF  \par III, IV, VI: PERRL, EOMI.   \par V: Facial sensation is normal B/L.   \par VII: Facial strength is normal B/L. \par VIII: Decreased hearing, bilaterally. \par IX, X: Palate is midline and elevates symmetrically.   \par XI: Trapezius normal strength.   \par XII: Tongue midline without atrophy or fasciculations. \par \par Motor exam  \par Muscle tone -mild cogwheel rigidity in both arms.\par Tremor with action and posture: High-frequency, low amplitude..  \par No atrophy or fasciculations \par Muscle Strength: arms and legs, proximal and distal flexors and extensors are normal \par \par No UE drift.\par \par Reflexes \par All present, normal, and symmetrical \par Plantars right: mute.   \par Plantars left: mute.   \par \par Coordination \par Slow, no ataxia, tremor with action- FNF, DONALD, HKS. \par \par Sensory \par Decreased sensation to MM B.  Stocking distribution decreased sensation to pinprick group home up the lower leg.  Reduced distal sensation to cold and vibration. \par \par Gait \par He is able to walk on his heels and toes and do a few steps with tandem gait.  \par Slow, wide based gait. \par \par

## 2022-06-07 ENCOUNTER — NON-APPOINTMENT (OUTPATIENT)
Age: 82
End: 2022-06-07

## 2022-06-07 PROCEDURE — 93248 EXT ECG>7D<15D REV&INTERPJ: CPT

## 2022-06-08 ENCOUNTER — NON-APPOINTMENT (OUTPATIENT)
Age: 82
End: 2022-06-08

## 2022-06-13 ENCOUNTER — RESULT REVIEW (OUTPATIENT)
Age: 82
End: 2022-06-13

## 2022-06-13 ENCOUNTER — TRANSCRIPTION ENCOUNTER (OUTPATIENT)
Age: 82
End: 2022-06-13

## 2022-06-13 ENCOUNTER — APPOINTMENT (OUTPATIENT)
Dept: THORACIC SURGERY | Facility: HOSPITAL | Age: 82
End: 2022-06-13

## 2022-06-17 ENCOUNTER — NON-APPOINTMENT (OUTPATIENT)
Age: 82
End: 2022-06-17

## 2022-06-17 ENCOUNTER — APPOINTMENT (OUTPATIENT)
Dept: CARDIOLOGY | Facility: CLINIC | Age: 82
End: 2022-06-17

## 2022-06-17 VITALS
OXYGEN SATURATION: 94 % | WEIGHT: 192 LBS | HEIGHT: 74 IN | TEMPERATURE: 98.4 F | BODY MASS INDEX: 24.64 KG/M2 | DIASTOLIC BLOOD PRESSURE: 60 MMHG | SYSTOLIC BLOOD PRESSURE: 130 MMHG

## 2022-06-17 PROCEDURE — 99214 OFFICE O/P EST MOD 30 MIN: CPT

## 2022-06-17 PROCEDURE — 93280 PM DEVICE PROGR EVAL DUAL: CPT

## 2022-06-17 PROCEDURE — 93000 ELECTROCARDIOGRAM COMPLETE: CPT | Mod: 59

## 2022-06-17 NOTE — REASON FOR VISIT
[FreeTextEntry1] : The patient underwent permanent pacemaker implantation on Monday of this week.  I brought him to the office today to assure that the pacemaker is functioning properly and to also analyze the patient's rhythm.  The indication for the pacemaker was a demonstration of long pauses on ZIO monitoring and I also noted episodes of atrial fibrillation.

## 2022-06-17 NOTE — DISCUSSION/SUMMARY
[FreeTextEntry1] : The patient's clinical examination today is stable.  His electrocardiogram reveals normal sinus rhythm low voltage frontal plane and right bundle branch block.\par The pacemaker was interrogated.  The pacemaker function is entirely satisfactory.  No adjustments in the pacemaker were needed.  The basic rate is 60 with a rate response mode.  Also the patient was found to have episodes of atrial fibrillation.  In order to prevent a thromboembolic complication we will scribed Eliquis at a reduced dose of 2.5 mg twice a day.  I discussed this with the patient's hematologist.  The patient is followed for periodic iron deficiency although a source of bleeding has never been discovered.  The lower dose is in recognition of the possibility of occult GI bleeding.  The patient's last hemoglobin however was greater than 14.  He will be followed by his hematologist very closely for any signs of bleeding or iron deficiency anemia.  Aspirin will be discontinued.

## 2022-06-20 ENCOUNTER — NON-APPOINTMENT (OUTPATIENT)
Age: 82
End: 2022-06-20

## 2022-06-23 ENCOUNTER — APPOINTMENT (OUTPATIENT)
Dept: CARDIOLOGY | Facility: CLINIC | Age: 82
End: 2022-06-23

## 2022-06-28 ENCOUNTER — APPOINTMENT (OUTPATIENT)
Dept: ENDOCRINOLOGY | Facility: CLINIC | Age: 82
End: 2022-06-28

## 2022-06-28 VITALS
BODY MASS INDEX: 24.64 KG/M2 | HEART RATE: 82 BPM | TEMPERATURE: 98 F | OXYGEN SATURATION: 94 % | SYSTOLIC BLOOD PRESSURE: 120 MMHG | HEIGHT: 74 IN | DIASTOLIC BLOOD PRESSURE: 70 MMHG | WEIGHT: 192 LBS

## 2022-06-28 DIAGNOSIS — R53.83 OTHER FATIGUE: ICD-10-CM

## 2022-06-28 LAB
ANION GAP SERPL CALC-SCNC: 12 MMOL/L
APPEARANCE: CLEAR
BASOPHILS # BLD AUTO: 0.07 K/UL
BASOPHILS NFR BLD AUTO: 1 %
BILIRUBIN URINE: NEGATIVE
BLOOD URINE: NEGATIVE
BUN SERPL-MCNC: 21 MG/DL
CALCIUM SERPL-MCNC: 9.9 MG/DL
CHLORIDE SERPL-SCNC: 104 MMOL/L
CO2 SERPL-SCNC: 25 MMOL/L
COLOR: NORMAL
CREAT SERPL-MCNC: 0.95 MG/DL
EGFR: 81 ML/MIN/1.73M2
EOSINOPHIL # BLD AUTO: 0.27 K/UL
EOSINOPHIL NFR BLD AUTO: 3.7 %
FRUCTOSAMINE SERPL-MCNC: 306 UMOL/L
GLIADIN IGA SER QL: <5 UNITS
GLIADIN IGG SER QL: <5 UNITS
GLIADIN PEPTIDE IGA SER-ACNC: NEGATIVE
GLIADIN PEPTIDE IGG SER-ACNC: NEGATIVE
GLUCOSE QUALITATIVE U: ABNORMAL
GLUCOSE SERPL-MCNC: 169 MG/DL
HCT VFR BLD CALC: 53.6 %
HGB BLD-MCNC: 17.4 G/DL
IMM GRANULOCYTES NFR BLD AUTO: 0.8 %
INSULIN SERPL-MCNC: 21 UU/ML
KETONES URINE: NEGATIVE
LEUKOCYTE ESTERASE URINE: NEGATIVE
LYMPHOCYTES # BLD AUTO: 1.2 K/UL
LYMPHOCYTES NFR BLD AUTO: 16.4 %
MAN DIFF?: NORMAL
MCHC RBC-ENTMCNC: 32.5 GM/DL
MCHC RBC-ENTMCNC: 32.6 PG
MCV RBC AUTO: 100.4 FL
MONOCYTES # BLD AUTO: 0.63 K/UL
MONOCYTES NFR BLD AUTO: 8.6 %
NEUTROPHILS # BLD AUTO: 5.07 K/UL
NEUTROPHILS NFR BLD AUTO: 69.5 %
NITRITE URINE: NEGATIVE
PH URINE: 6
PLATELET # BLD AUTO: 131 K/UL
POTASSIUM SERPL-SCNC: 4.5 MMOL/L
PROTEIN URINE: NEGATIVE
RBC # BLD: 5.34 M/UL
RBC # FLD: 12.9 %
SODIUM SERPL-SCNC: 141 MMOL/L
SPECIFIC GRAVITY URINE: 1.03
THYROGLOB AB SERPL-ACNC: <20 IU/ML
THYROPEROXIDASE AB SERPL IA-ACNC: 70.3 IU/ML
TTG IGA SER IA-ACNC: <1.2 U/ML
TTG IGA SER-ACNC: NEGATIVE
TTG IGG SER IA-ACNC: 2.4 U/ML
TTG IGG SER IA-ACNC: NEGATIVE
UROBILINOGEN URINE: NORMAL
WBC # FLD AUTO: 7.3 K/UL

## 2022-06-28 PROCEDURE — 99214 OFFICE O/P EST MOD 30 MIN: CPT

## 2022-06-28 NOTE — HISTORY OF PRESENT ILLNESS
[FreeTextEntry1] : Jun 28, 2022   in person\par \par PCP:  Dr. Danny Brewer at [facility]  p \par          Hematology - Dr. Jakc    Hx DVT\par          Card:  Dr. Baldemar Montes  - pacemaker\par          Neurology:  Dr. Micaela Black - memory - numbness, tingling \par \par CC:  Low T4, normal TSH  (on Cytomel 25 mcg daily)\par         A1c 7.0\par        (low iron)  \par        (Hx DVT)\par        (memory - Dr. Oneil)\par        (depression)    \par \par 81 yo visits primarily because of history of hypothyroidism   Has positive TPO antibodies.  \par He also has diabetes -  (A1c 7.0%)\par \par : :\par Constitutional:  Alert, well nourished, healthy appearance, no acute distress \par Eyes:  No proptosis, no stare\par Thyroid:  normal to palpation\par Pulmonary:  No respiratory distress, no accessory muscle use; normal rate and effort\par Cardiac:  Normal rate\par Vascular: \par Endocrine:  No stigmata of Cushing’s Syndrome\par Musculoskeletal:  Normal gait, no involuntary movements\par Neurology:  No tremors\par Affect/Mood/Psych:  Oriented x 3; normal affect, normal insight/judgement, normal mood \par .\par  Impression:  Taking liothyronine 25 mcg daily and TSH in good range on that dose.\par Recent A1c a few weeks ago remains the same a 7.0%     \par He attributes his success at keeping A1c in good range to activity (usually swims, but since pacemaker he has been using bike)\par and cutting back on carbs and sweets.\par \par Plan:  Check fingerstick BS about once a week and bring records to next visit.\par He already sees ophthalmology once a year and has a podiatrist.\par Continue LT3 25 mcg and ROV in November.  \par Cytomel 25 mcg daily\par Farxiga 5 mg daily *\par atenolol 12.5\par Mirapex 1 mg\par lexapro 20 mg\par Norvasc 7.5 mg\par metformin 100 mg\par \par in PM\par atenolol 12.5 mg\par pirapex 2 mg\par Lipitor   \par \par \par Mar 01, 2022\par \par PCP:  Dr. Danny Brewer at [facility]  p \par          Hematology - Dr. Jack\par \par CC:  Low T4, normal TSH  (on Cytomel 25 mcg daily)\par         A1c 7.0\par        (low iron)  \par        (Hx DVT)\par        (memory - Dr. Oneil)\par        (depression)    \par \par 82 yo on\par Cytomel 25 mcg daily\par Farxiga 5 mg daily *\par atenolol 12.5\par Mirapex 1 mg\par lexapro 20 mg\par Norvasc 7.5 mg\par metformin 100 mg\par \par in PM\par atenolol 12.5 mg\par pirapex 2 mg\par Lipitor   \par \par Recent labs include\par A1c 7.0\par TSH 1.87\par vit D 29.7\par \par T4 2.7 free T4 0.8\par \par : :\par Constitutional:  Alert, well nourished, healthy appearance, no acute distress \par Eyes:  No proptosis, no stare\par Thyroid:\par Pulmonary:  No respiratory distress, no accessory muscle use; normal rate and effort\par Cardiac:  Normal rate\par Vascular: \par Endocrine:  No stigmata of Cushing’s Syndrome\par Musculoskeletal:  Normal gait, no involuntary movements\par Neurology:  No tremors\par Affect/Mood/Psych:  Oriented x 3; normal affect, normal insight/judgement, normal mood \par .\par \par Impression:  Low T4 likely reflects the use of T3 Rx (Cytomel)\par As long as TSH wnl  he can stay on this dose, although may conisder combination of Lt4 and Lt3 in the future\par (e.g., LT3 5 plus LT4 75 mcg daily.   \par \par Plan:  Updated labs today.\par Can consider switch to levothyroxine at 88 mcg daily.\par \par \par \par

## 2022-07-15 ENCOUNTER — RESULT REVIEW (OUTPATIENT)
Age: 82
End: 2022-07-15

## 2022-07-15 ENCOUNTER — APPOINTMENT (OUTPATIENT)
Dept: HEMATOLOGY ONCOLOGY | Facility: CLINIC | Age: 82
End: 2022-07-15

## 2022-07-15 VITALS
HEIGHT: 74 IN | BODY MASS INDEX: 24.92 KG/M2 | HEART RATE: 64 BPM | TEMPERATURE: 97.3 F | WEIGHT: 194.19 LBS | DIASTOLIC BLOOD PRESSURE: 69 MMHG | OXYGEN SATURATION: 95 % | SYSTOLIC BLOOD PRESSURE: 134 MMHG | RESPIRATION RATE: 16 BRPM

## 2022-07-21 ENCOUNTER — NON-APPOINTMENT (OUTPATIENT)
Age: 82
End: 2022-07-21

## 2022-07-21 ENCOUNTER — APPOINTMENT (OUTPATIENT)
Dept: CARDIOLOGY | Facility: CLINIC | Age: 82
End: 2022-07-21

## 2022-07-21 VITALS
BODY MASS INDEX: 24.9 KG/M2 | HEIGHT: 74 IN | OXYGEN SATURATION: 93 % | HEART RATE: 81 BPM | WEIGHT: 194 LBS | DIASTOLIC BLOOD PRESSURE: 70 MMHG | SYSTOLIC BLOOD PRESSURE: 140 MMHG

## 2022-07-21 DIAGNOSIS — R07.89 OTHER CHEST PAIN: ICD-10-CM

## 2022-07-21 PROCEDURE — 93000 ELECTROCARDIOGRAM COMPLETE: CPT | Mod: 59

## 2022-07-21 PROCEDURE — 99214 OFFICE O/P EST MOD 30 MIN: CPT

## 2022-07-21 PROCEDURE — 93288 INTERROG EVL PM/LDLS PM IP: CPT

## 2022-07-21 NOTE — DISCUSSION/SUMMARY
[FreeTextEntry1] : Patient's clinical condition has improved quite substantially over the past several weeks.  He has resumed activities and has had no cardiac symptoms.  There have been no symptoms of dizziness or lightheadedness.  Cardiorespiratory examination is normal; the patient has had no lower extremity findings of DVT however he has had a recent repeat DVT study which will be reviewed tomorrow by his hematologist.  The patient is tolerating anticoagulation with no evidence of bleeding\par \par His electrocardiogram today reveals atrial pacing with natural AV conduction with underlying right bundle morphology the magnet test of the pacemaker is satisfactory.  Based on my evaluation and assessment I believe the patient's condition is improved and stable.  The present medications will be continued at this time.

## 2022-07-21 NOTE — REASON FOR VISIT
[FreeTextEntry1] : The patient returns for follow-up today he is followed with a diagnosis of sick sinus syndrome and paroxysmal atrial fibrillation.  He recently received a dual-chamber pacemaker.  The patient's clinical condition has improved he has resumed his program of exercise.\par There have been no symptoms of unusual chest pain or shortness of breath.  Patient had some recurrent discomfort in the right thigh and underwent another DVT study.  The patient will be consulting his hematologist tomorrow.  He does remain on full anticoagulation.  The patient has been treated with a low-dose of Eliquis 2.5 mg twice daily.  This had been recommended by his hematologist.  In light of the fact that there is no evidence of ongoing bleeding and that the patient's recent CBCs show a hemoglobin in excess of 17 g I would recommend that the dose be increased to the usual 5 mg twice daily.

## 2022-07-22 ENCOUNTER — APPOINTMENT (OUTPATIENT)
Dept: HEMATOLOGY ONCOLOGY | Facility: CLINIC | Age: 82
End: 2022-07-22

## 2022-07-22 ENCOUNTER — NON-APPOINTMENT (OUTPATIENT)
Age: 82
End: 2022-07-22

## 2022-07-28 ENCOUNTER — APPOINTMENT (OUTPATIENT)
Dept: HEMATOLOGY ONCOLOGY | Facility: CLINIC | Age: 82
End: 2022-07-28

## 2022-07-28 PROCEDURE — 99443: CPT | Mod: 25,95

## 2022-07-28 NOTE — CONSULT LETTER
[FreeTextEntry3] : Rancho Jack MD, MPH\par Attending Physician\par Hematology Oncology\par [facility] [place]\par [facility]\par

## 2022-07-28 NOTE — ASSESSMENT
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 clinically silent\par Advised to obtain sleep studies. 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\par Discussed at length about the findings, implications, treatment\par Advised to have her blood relatives tested\par Information given to the patient in the form of [email or web address] website and reading material\par Discussed about the low iron diet and treatment options including periodic phlebotomy, iron chelator\par Liver biopsy is the best test to assess iron content and to rule out liver damage including cirrhosis\par Other option is MRI liver, which can estimate iron content, but is not as sensitive to look for cirrhosis\par Target Ferritin <  and iron saturation < 45%\par \par Iron deficiency anemia\par Likely from GI blood loss\par Had EGD and capsule endoscopy with Dr Martinez\par June 2019 EGD showed 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 Aguillon.\par Ferritin declining. \par No further iV iron given erythrocytosis and ?hemochromatosis\par \par Right LE DVT\par Heterozygous prothrombin gene mutation\par Likely unprovoked\par He reports that he had an extensive occlusive right LE DVT- started having symptoms again since stopping xarelto\par Completed 6 months of AC (xarelto) in April 2019\par Keep uptodate with cancer screening\par Repeat Doppler shows chronic DVT. Reassured that it was not the cause of his LE pain\par Continue prophylactic eliquis for now\par \par Labs in 4-5 months\par CBC, CMP iron studies, ferritin, erythropoietin, AFP, GGT\par OV few days later

## 2022-07-28 NOTE — HISTORY OF PRESENT ILLNESS
[Home] : at home, [unfilled] , at the time of the visit. [Other Location: e.g. Home (Enter Location, City,State)___] : at [unfilled] [Verbal consent obtained from patient] : the patient, [unfilled] [de-identified] : Mr Young is a very pleasant 78 year old gentleman with HTN, HLD here to discuss management for her right LE DVT\par \par Around beginning of October- he felt as sharp pain righjt calf, no swelling, discomfort\par He went to a local physician - who did ultrasound- sent to [facility]- where he had repeat ultrasoud and was placed on xarelto \par Tolerated well\par \par He had a repeat ultrasound 1/31/19 which showed partially occlusive DVT of the right femoral and polpiteal veins with mild reduction of the venous return\par He was advised to stop xarelto 2/12/19\par \par He feels that past few days - he has started having the same pain right calf pain. No swelling\par \par \par He is retired. 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 However, the\par actual risk may be modified by synergistic interaction with other factors.\par Correlation with this patients clinical condition and/or with results of other\par relevant tests, and genetic counseling is suggested.\par \par Factor V leiden mutation, APS, protein S, AT3 def- negative\par \par Also saw Dr Martinez s/p EGD\par EGD:\par 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 Gastric polyp s/p biopsy\par 3. Irregular Z-line s/p biopsy\par \par Ferritin 80 (5/2020) dropped to 19.8 910/16/20)\par \par He had capsule endoscopy, MRI with Dr Lucien Aguillon (GI) and was told it was normal\par \par He has his blood work done with [facility] (12/14/21)\par No CBC. Ferritin 95 [de-identified] : Telemedicine (video, audio) done today for follow up\par Consent obtained from the patient\par Accompanied by his wife (Wendy)\par s/p Pfizer COVID Vaccine x 3\par \par He had right thigh pain- had repeat Doppler (7/15/22) which showed chronic right LE DVT unchanged from 2018\par Pain has resolved\par He is on prophylactic eliquis for AFib

## 2022-07-29 ENCOUNTER — APPOINTMENT (OUTPATIENT)
Dept: HEMATOLOGY ONCOLOGY | Facility: CLINIC | Age: 82
End: 2022-07-29

## 2022-09-15 ENCOUNTER — NON-APPOINTMENT (OUTPATIENT)
Age: 82
End: 2022-09-15

## 2022-09-15 ENCOUNTER — APPOINTMENT (OUTPATIENT)
Dept: OPHTHALMOLOGY | Facility: CLINIC | Age: 82
End: 2022-09-15

## 2022-09-15 PROCEDURE — 92014 COMPRE OPH EXAM EST PT 1/>: CPT

## 2022-10-11 NOTE — HISTORY OF PRESENT ILLNESS
[FreeTextEntry1] : There have been no symptoms of chest pain or discomfort. Lately the patient has undergone an extensive hematologic and gastrointestinal evaluation for evaluation of iron deficiency and slight iron deficiency anemia. The evidence includes low ferritin level as well as a low iron saturation. The GI evaluation revealed only a slight very minor source of bleeding and it was cauterized. The patient had been given on anticoagulants her relative following an episode of deep vein thrombosis which occurred about 2 years ago. She was discontinued after a few months. The patient has now been on daily aspirin daily.
Never

## 2022-10-20 ENCOUNTER — RESULT REVIEW (OUTPATIENT)
Age: 82
End: 2022-10-20

## 2022-10-20 ENCOUNTER — APPOINTMENT (OUTPATIENT)
Dept: HEMATOLOGY ONCOLOGY | Facility: CLINIC | Age: 82
End: 2022-10-20

## 2022-10-20 VITALS
HEIGHT: 74 IN | BODY MASS INDEX: 25.28 KG/M2 | SYSTOLIC BLOOD PRESSURE: 148 MMHG | OXYGEN SATURATION: 95 % | DIASTOLIC BLOOD PRESSURE: 67 MMHG | TEMPERATURE: 97.4 F | HEART RATE: 82 BPM | WEIGHT: 197 LBS | RESPIRATION RATE: 16 BRPM

## 2022-10-27 ENCOUNTER — APPOINTMENT (OUTPATIENT)
Dept: HEMATOLOGY ONCOLOGY | Facility: CLINIC | Age: 82
End: 2022-10-27

## 2022-10-27 VITALS
RESPIRATION RATE: 18 BRPM | HEART RATE: 85 BPM | SYSTOLIC BLOOD PRESSURE: 130 MMHG | HEIGHT: 74 IN | WEIGHT: 199 LBS | TEMPERATURE: 98.9 F | DIASTOLIC BLOOD PRESSURE: 66 MMHG | BODY MASS INDEX: 25.54 KG/M2 | OXYGEN SATURATION: 96 %

## 2022-10-27 PROCEDURE — 36415 COLL VENOUS BLD VENIPUNCTURE: CPT

## 2022-10-27 PROCEDURE — 99214 OFFICE O/P EST MOD 30 MIN: CPT | Mod: 25

## 2022-10-27 NOTE — ASSESSMENT
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 clinically silent\par Advised to obtain sleep studies. 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 Reassured that it was not the cause of his LE pain\par He is on Eliquis for A. Fib as well\par \par Labs in 4-5 months\par CBC, CMP iron studies, ferritin, erythropoietin, AFP, GGT\par OV few days later

## 2022-10-27 NOTE — HISTORY OF PRESENT ILLNESS
[de-identified] : Mr Young is a very pleasant 78 year old gentleman with HTN, HLD here to discuss management for her right LE DVT\par \par Around beginning of October- he felt as sharp pain righjt calf, no swelling, discomfort\par He went to a local physician - who did ultrasound- sent to [facility]- where he had repeat ultrasoud and was placed on xarelto \par Tolerated well\par \par He had a repeat ultrasound 1/31/19 which showed partially occlusive DVT of the right femoral and polpiteal veins with mild reduction of the venous return\par He was advised to stop xarelto 2/12/19\par \par He feels that past few days - he has started having the same pain right calf pain. No swelling\par \par \par He is retired. He goes to a shelter program 5 days a week, swims 3 days a week\par He always sits cross legged, when not on a recliner\par Denies any long distance travel, surgeries, immobility prior to the event\par \par Has ho prostate cancer had radical prostatectomy [facility] ~ 5 years ago (9/2014).\par No DVT post surgery\par \par No personal or family ho DVT\par \par Colonoscopy (within last year) - Dr Lucien Aguillon - was normal \par \par \par Prothrombin gene mutation:\par HETEROZYGOUS\par INTERPRETATION:\par This patient is heterozygous for the Prothrombin [id number] mutation, and therefore\par at an increased risk(up to 3 fold) for thrombotic complications. However, the\par actual risk may be modified by synergistic interaction with other factors.\par Correlation with this patients clinical condition and/or with results of other\par relevant tests, and genetic counseling is suggested.\par \par Factor V leiden mutation, APS, protein S, AT3 def- negative\par \par Also saw Dr Martinez s/p EGD\par EGD:\par 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 Gastric polyp s/p biopsy\par 3. Irregular Z-line s/p biopsy\par \par Ferritin 80 (5/2020) dropped to 19.8 910/16/20)\par \par He had capsule endoscopy, MRI with Dr Lucien Aguillon (GI) and was told it was normal\par \par He has his blood work done with [facility] (12/14/21)\par No CBC. Ferritin 95 [de-identified] : Garfield is here for follow up\par s/p Pfizer COVID Vaccine x 3\par \par He has been having a cold in the last week, seen by his PCP earlier this morning with normal PE and neg Covid.  Cough was clear and denies of any fever. \par Pain on thigh resolved \par He is on prophylactic eliquis for AFib

## 2022-10-27 NOTE — CONSULT LETTER
[Dear  ___] : Dear  [unfilled], [Courtesy Letter:] : I had the pleasure of seeing your patient, [unfilled], in my office today. [Please see my note below.] : Please see my note below. [Consult Closing:] : Thank you very much for allowing me to participate in the care of this patient.  If you have any questions, please do not hesitate to contact me. [Sincerely,] : Sincerely, [DrDariela  ___] : Dr. PRETTY [DrDariela ___] : Dr. PRETTY [FreeTextEntry3] : Rancho Jack MD, MPH\par Attending Physician\par Hematology Oncology\par [facility]\par [facility]\par

## 2022-11-15 ENCOUNTER — APPOINTMENT (OUTPATIENT)
Dept: CARDIOLOGY | Facility: CLINIC | Age: 82
End: 2022-11-15

## 2022-11-15 VITALS
DIASTOLIC BLOOD PRESSURE: 68 MMHG | WEIGHT: 193.25 LBS | SYSTOLIC BLOOD PRESSURE: 134 MMHG | HEART RATE: 80 BPM | BODY MASS INDEX: 24.8 KG/M2 | HEIGHT: 74 IN | RESPIRATION RATE: 18 BRPM | OXYGEN SATURATION: 96 % | TEMPERATURE: 97.8 F

## 2022-11-15 DIAGNOSIS — R06.09 OTHER FORMS OF DYSPNEA: ICD-10-CM

## 2022-11-15 PROCEDURE — 93000 ELECTROCARDIOGRAM COMPLETE: CPT

## 2022-11-15 PROCEDURE — 99214 OFFICE O/P EST MOD 30 MIN: CPT

## 2022-11-15 NOTE — REVIEW OF SYSTEMS
[Negative] : Heme/Lymph [FreeTextEntry7] : The patient has a remote history of upper GI bleeding related to duodenal angio ectasia.  This has been quiescent for many years.

## 2022-11-15 NOTE — DISCUSSION/SUMMARY
[FreeTextEntry1] : Patient's clinical condition is stable.  There have been no acute cardiac symptoms.  Symptoms of neuropathy have improved.  There have been no symptoms of acute shortness of breath palpitations or chest pain.  The patient notices an occasional tingling in the right axillary area.  Pacemaker function has been satisfactory.  We have been surveilling the pacemaker.\par Patient did did well on today's treadmill test.  There was no clinical evidence of exercise-induced myocardial ischemia and he was easily able to complete Francis stage II of his standard Francis protocol.  The rhythm was underlying atrial fibrillation with increasing ventricular pacing during the exercise reflecting a rate response mode.\par Plan to continue the current medications the patient is on full anticoagulation with Eliquis at a dosage of 2.5 mg twice daily.\par

## 2022-11-15 NOTE — REASON FOR VISIT
[FreeTextEntry1] : Patient returns for follow-up today.  There is a principal diagnosis of atrial fibrillation and pacemaker.  There is a diagnosis of sick sinus syndrome as well as peripheral neuropathy.  There is a history of a remote coronary event however I could never confirm this.  Presently the patient is feeling well and reports some progress with his neuropathy.  \par Patient has been treated for chronic depression and recently has been placed on Abilify.

## 2022-11-28 ENCOUNTER — APPOINTMENT (OUTPATIENT)
Dept: NEUROLOGY | Facility: CLINIC | Age: 82
End: 2022-11-28

## 2022-11-28 VITALS
HEART RATE: 69 BPM | HEIGHT: 74 IN | DIASTOLIC BLOOD PRESSURE: 68 MMHG | BODY MASS INDEX: 24.9 KG/M2 | SYSTOLIC BLOOD PRESSURE: 137 MMHG | OXYGEN SATURATION: 95 % | WEIGHT: 194 LBS

## 2022-11-28 DIAGNOSIS — M79.651 PAIN IN RIGHT THIGH: ICD-10-CM

## 2022-11-28 PROCEDURE — 99214 OFFICE O/P EST MOD 30 MIN: CPT

## 2022-11-28 NOTE — HISTORY OF PRESENT ILLNESS
[FreeTextEntry1] : Here in f/u\par Notes tingling under the right arm today \par No pain \par no loss of strength in arm or hand\par does not keep him awake\par \par Tingling in feet is controlled\par no pain in feet or legs\par Balance is stable \par \par RLS symptoms are controlled \par Had a bout of increased depression - took Abilify for a short time and then stopped \par Does behavioral therapy \par Increases his AA meeting attendance (for 47 years) and increases swimming - this helps him with the depression\par Following closely with psychiatrist \par \par Memory complaints are stable - \par He feels short term is more affected \par remembers faces and names\par misplaces objects \par \par \par 5/31/22\par Emigdio Young is a 81 year old man with a history of type 2 diabetes, DVT, RBBB, dyslipidemia. He is establishing care with me.\par \par He notes tingling under arm pits and right sided tingling in his feet.\par No loss of balance\par no loss of strength- swims frequently.\par \par Mirapex was decreased from 4 to 3 tab\par psychopharmacologist reduced it\par This is to help with depression apparently.\par depression - ECT - \par \par A1C 7\par \par Memory complaints are stable.\par \par \par 11/29/21- HPI from Dr. Cruz and Skye Rogers NP\par History obtained from patient and wife.\par \par Emigdio Young is a 81 year old man with a history of type 2 diabetes, DVT, RBBB, dyslipidemia, presenting for a follow up appointment to review his EMG results.  \par \par He has not had any decline in memory since his last visit. He did not complete the MRI or neuropsychological evaluation previously ordered. As per his wife, he is able to function but occasionally will forget some names. \par \par He continues to have intermediate sharp pain in his right medial thigh. He will have occasional numbness in his lower legs and feet. Denies any worsening of symptoms or weakness. He is monitoring his glucose with his PCP.\par \par The remaining neurological review of systems is negative. \par \par 8/26/21 \par This is a 81-year-old man with several months of pain.  He has 1 very localized sharp pain in the medial thigh.  There is tenderness there.  He also has pain in the medial foot in the arch which sometimes radiates to the big toe.  He has occasional feeling of numbness in both feet.  He is not sure how long this has been.  He never had any low back pain and no radiating pain in the legs.  He has no urinary problems.  He has problems with constipation.\par The pain is better if he is moving around.  There is tenderness in the medial thigh and medial foot.\par He has had diabetes mellitus for 1 to 2 years.  He has Dupuytren's contracture, bilaterally.\par He has a history of alcohol abuse and has been a member of Alcoholics Anonymous for 45 years.  He has not drank alcohol for decades.\par He has a history of depression, he has had ECT in the distant past.\par \par He says that he has slight short-term memory problems but he feels this is due to impaired attention.  He is able to perform all of his IADLs.

## 2022-11-28 NOTE — ASSESSMENT
[FreeTextEntry1] : This is a 81-year-old man with multiple issues:\par \par For diabetic neuropathy- recommended tight control of glucose. needs f/u with Dr. Hellerman (did not get any blood work done)\par No pain. \par \par RLS symptoms under good cotnrol.\par \par Memory changes- stable- At present monitor.\par \par Follow up in 6 months with Dr. Black. \par \par

## 2022-11-28 NOTE — PHYSICAL EXAM
[FreeTextEntry1] : Physical examination \par General: No acute distress, Awake, Alert.   \par \par Mental status \par Awake, alert, oriented. normal language and concentration. normal insight.\par \par Cranial Nerves \par II: VFF  \par III, IV, VI: PERRL, EOMI.   \par V: Facial sensation is normal B/L.   \par VII: Facial strength is normal B/L. \par VIII: Decreased hearing, bilaterally. \par IX, X: Palate is midline and elevates symmetrically.   \par XI: Trapezius normal strength.   \par XII: Tongue midline without atrophy or fasciculations. \par \par Motor exam  \par Muscle tone -mild cogwheel rigidity in both arms.\par Tremor with action and posture: High-frequency, low amplitude..  \par No atrophy or fasciculations \par Muscle Strength: arms and legs, proximal and distal flexors and extensors are normal \par \par No UE drift.\par \par Reflexes \par All present, normal, and symmetrical \par Plantars right: mute.   \par Plantars left: mute.   \par \par Coordination \par Slow, no ataxia, tremor with action- FNF, DONALD, HKS. \par \par Sensory \par Decreased sensation to MM B.  Stocking distribution decreased sensation to pinprick detention up the lower leg.  Reduced distal sensation to cold and vibration. \par \par Gait \par He is able to walk on his heels and toes and do a few steps with tandem gait.  \par Slow, wide based gait. \par \par

## 2022-11-28 NOTE — CONSULT LETTER
[Dear  ___] : Dear  [unfilled], [Please see my note below.] : Please see my note below. [Consult Closing:] : Thank you very much for allowing me to participate in the care of this patient.  If you have any questions, please do not hesitate to contact me. [DrDariela  ___] : Dr. PRETTY [Courtesy Letter:] : I had the pleasure of seeing your patient, [unfilled], in my office today. [FreeTextEntry3] : Sincerely,\par \par Micaela Black M.D.\par

## 2022-11-28 NOTE — REASON FOR VISIT
[Follow-Up: _____] : a [unfilled] follow-up visit [FreeTextEntry1] : tingling sensation under right armpit x 2 weeks will like A1c checked

## 2022-12-28 ENCOUNTER — NON-APPOINTMENT (OUTPATIENT)
Age: 82
End: 2022-12-28

## 2023-01-12 ENCOUNTER — APPOINTMENT (OUTPATIENT)
Dept: CARDIOLOGY | Facility: CLINIC | Age: 83
End: 2023-01-12
Payer: MEDICARE

## 2023-01-12 VITALS
DIASTOLIC BLOOD PRESSURE: 72 MMHG | HEIGHT: 74 IN | OXYGEN SATURATION: 95 % | HEART RATE: 88 BPM | WEIGHT: 195 LBS | SYSTOLIC BLOOD PRESSURE: 126 MMHG | BODY MASS INDEX: 25.03 KG/M2

## 2023-01-12 PROCEDURE — 93280 PM DEVICE PROGR EVAL DUAL: CPT

## 2023-01-12 RX ORDER — NAPROXEN 500 MG/1
500 TABLET ORAL
Qty: 20 | Refills: 0 | Status: COMPLETED | COMMUNITY
Start: 2022-10-10 | End: 2023-01-12

## 2023-01-12 RX ORDER — MELOXICAM 15 MG/1
15 TABLET ORAL
Qty: 30 | Refills: 0 | Status: COMPLETED | COMMUNITY
Start: 2021-04-07 | End: 2023-01-12

## 2023-01-12 RX ORDER — METFORMIN HYDROCHLORIDE 1000 MG/1
1000 TABLET, COATED ORAL DAILY
Refills: 0 | Status: ACTIVE | COMMUNITY

## 2023-01-12 RX ORDER — PRAMIPEXOLE DIHYDROCHLORIDE 1 MG/1
1 TABLET ORAL 3 TIMES DAILY
Refills: 0 | Status: ACTIVE | COMMUNITY

## 2023-01-12 RX ORDER — COVID-19 ANTIGEN TEST
KIT MISCELLANEOUS
Qty: 8 | Refills: 0 | Status: COMPLETED | COMMUNITY
Start: 2022-10-28 | End: 2023-01-12

## 2023-01-12 NOTE — REVIEW OF SYSTEMS
[Fever] : no fever [Headache] : no headache [Chills] : no chills [Feeling Fatigued] : not feeling fatigued [SOB] : no shortness of breath [Chest Discomfort] : no chest discomfort [Syncope] : no syncope [Cough] : no cough [Dizziness] : no dizziness [Confusion] : no confusion was observed [Easy Bleeding] : no tendency for easy bleeding [FreeTextEntry5] : occasional palpitations

## 2023-01-12 NOTE — PROCEDURE
[No] : not [NSR] : normal sinus rhythm [Threshold Testing Performed] : Threshold testing was performed [None] : none [Counters Reset] : the counters were reset [de-identified] : Angelique DE SANTIAGO DR MRI [id number] [de-identified] : [id number] [de-identified] : 6/13/22 [de-identified] : AAIR<=>DDDR [de-identified] : 60/130 [de-identified] : 12.7 years [de-identified] : Normal device function

## 2023-01-12 NOTE — HISTORY OF PRESENT ILLNESS
[de-identified] : S/p Medtronic dual chamber pacemaker for sick sinus syndrome with long pauses on 6/13/22, atrial fibrillation on Eliquis, hypertension, hyperlipidemia, DM typ2, history of DVT, hypothyroidism, iron deficiency anemia. \par \par He denies chest pain, SOB, dizziness or syncope. He has occasional episodes of palpitations. \par

## 2023-01-12 NOTE — PHYSICAL EXAM
[Normal Appearance] : normal appearance [Well Groomed] : well groomed [General Appearance - In No Acute Distress] : no acute distress [Heart Rate And Rhythm] : heart rate and rhythm were normal [] : no respiratory distress

## 2023-01-12 NOTE — DISCUSSION/SUMMARY
[Pacemaker Function Normal] : normal pacemaker function [Patient] : the patient [Family] : the patient's family [de-identified] : Device analysis in 6 months. Continue remote monitoring.

## 2023-01-13 NOTE — HISTORY OF PRESENT ILLNESS
[FreeTextEntry1] : Mar 01, 2022\par \par PCP:  Dr. Danny Brewer at [facility]  p \par          Hematology - Dr. Jack\par \par CC:  Low T4, normal TSH  (on Cytomel 25 mcg daily)\par         A1c 7.0\par        (low iron)  \par        (Hx DVT)\par        (memory - Dr. Oneil)\par        (depression)    \par \par 82 yo on\par Cytomel 25 mcg daily\par Farxiga 5 mg daily *\par atenolol 12.5\par Mirapex 1 mg\par lexapro 20 mg\par Norvasc 7.5 mg\par metformin 100 mg\par \par in PM\par atenolol 12.5 mg\par pirapex 2 mg\par Lipitor   \par \par Recent labs include\par A1c 7.0\par TSH 1.87\par vit D 29.7\par \par T4 2.7 free T4 0.8\par \par : :\par Constitutional:  Alert, well nourished, healthy appearance, no acute distress \par Eyes:  No proptosis, no stare\par Thyroid:\par Pulmonary:  No respiratory distress, no accessory muscle use; normal rate and effort\par Cardiac:  Normal rate\par Vascular: \par Endocrine:  No stigmata of Cushing’s Syndrome\par Musculoskeletal:  Normal gait, no involuntary movements\par Neurology:  No tremors\par Affect/Mood/Psych:  Oriented x 3; normal affect, normal insight/judgement, normal mood \par .\par \par Impression:  Low T4 likely reflects the use of T3 Rx (Cytomel)\par As long as TSH wnl  he can stay on this dose, although may conisder combination of Lt4 and Lt3 in the future\par (e.g., LT3 5 plus LT4 75 mcg daily.   \par \par Plan:  Updated labs today.\par Can consider switch to levothyroxine at 88 mcg daily.\par \par \par \par  negative

## 2023-01-23 ENCOUNTER — RESULT REVIEW (OUTPATIENT)
Age: 83
End: 2023-01-23

## 2023-01-23 ENCOUNTER — APPOINTMENT (OUTPATIENT)
Dept: HEMATOLOGY ONCOLOGY | Facility: CLINIC | Age: 83
End: 2023-01-23

## 2023-01-23 ENCOUNTER — APPOINTMENT (OUTPATIENT)
Dept: THORACIC SURGERY | Facility: CLINIC | Age: 83
End: 2023-01-23
Payer: MEDICARE

## 2023-01-23 VITALS
TEMPERATURE: 97.3 F | HEIGHT: 74 IN | SYSTOLIC BLOOD PRESSURE: 142 MMHG | WEIGHT: 200 LBS | HEART RATE: 69 BPM | DIASTOLIC BLOOD PRESSURE: 69 MMHG | OXYGEN SATURATION: 96 % | BODY MASS INDEX: 25.67 KG/M2 | RESPIRATION RATE: 17 BRPM

## 2023-01-23 PROCEDURE — 99214 OFFICE O/P EST MOD 30 MIN: CPT

## 2023-01-24 ENCOUNTER — RX RENEWAL (OUTPATIENT)
Age: 83
End: 2023-01-24

## 2023-01-26 NOTE — HISTORY OF PRESENT ILLNESS
[FreeTextEntry1] : 83yo M with PMHx of atrial fibrillation and sick sinus syndrome s/p medtronic dual chamber pacemaker placed 6/13/22. Patient referred back to office by cardiologist Dr. Montes for evaluation of PPM repositioning after patient endorses a peculiar vibration sensation near the pacemaker although interrogation indicates normal function. \par \par The patient has no other complaint

## 2023-01-26 NOTE — ASSESSMENT
[FreeTextEntry1] : This patient is 82-year-old gentleman who had a pacemaker placed last year.  Of the patient notices some discomfort and weird feelings at the pacemaker insertion site.  I looked at the insertion site, there is very thin skin on that site.  If that becomes thinner then the pacemaker would need to be placed submuscular.  However, I told the patient that if we do place it under the pectoralis that he would probably notice more uncomfortable sensations with movement.  The patient does not wish to do anything now.  The pacemaker appears to be functioning normally.  The patient will let me know if the skin gets worse and if necessary we will reposition within his chest wall deeper under the muscular layer.  All questions were answered

## 2023-01-26 NOTE — REASON FOR VISIT
[Follow-Up: _____] : a [unfilled] follow-up visit [FreeTextEntry1] : Re consult per Dr. Montes for PPM repositioning

## 2023-01-26 NOTE — PHYSICAL EXAM
[Sclera] : the sclera and conjunctiva were normal [PERRL With Normal Accommodation] : pupils were equal in size, round, and reactive to light [Extraocular Movements] : extraocular movements were intact [Neck Appearance] : the appearance of the neck was normal [Neck Cervical Mass (___cm)] : no neck mass was observed [Jugular Venous Distention Increased] : there was no jugular-venous distention [Thyroid Diffuse Enlargement] : the thyroid was not enlarged [Thyroid Nodule] : there were no palpable thyroid nodules [Auscultation Breath Sounds / Voice Sounds] : lungs were clear to auscultation bilaterally [Heart Rate And Rhythm] : heart rate was normal and rhythm regular [Heart Sounds] : normal S1 and S2 [Heart Sounds Gallop] : no gallops [Murmurs] : no murmurs [Heart Sounds Pericardial Friction Rub] : no pericardial rub [FreeTextEntry1] : Pacemaker in place on the left chest wall.  The skin is very thin in that area.  No evidence of migration or necrosis [Bowel Sounds] : normal bowel sounds [Abdomen Soft] : soft [Abdomen Tenderness] : non-tender [] : no hepato-splenomegaly [Abdomen Mass (___ Cm)] : no abdominal mass palpated [Cervical Lymph Nodes Enlarged Posterior Bilaterally] : posterior cervical [Cervical Lymph Nodes Enlarged Anterior Bilaterally] : anterior cervical [Supraclavicular Lymph Nodes Enlarged Bilaterally] : supraclavicular [Axillary Lymph Nodes Enlarged Bilaterally] : axillary [Femoral Lymph Nodes Enlarged Bilaterally] : femoral [Inguinal Lymph Nodes Enlarged Bilaterally] : inguinal

## 2023-02-02 ENCOUNTER — APPOINTMENT (OUTPATIENT)
Dept: HEMATOLOGY ONCOLOGY | Facility: CLINIC | Age: 83
End: 2023-02-02
Payer: MEDICARE

## 2023-02-02 VITALS
WEIGHT: 195.31 LBS | DIASTOLIC BLOOD PRESSURE: 69 MMHG | BODY MASS INDEX: 25.07 KG/M2 | HEIGHT: 74 IN | RESPIRATION RATE: 16 BRPM | SYSTOLIC BLOOD PRESSURE: 138 MMHG | TEMPERATURE: 96.9 F | HEART RATE: 79 BPM | OXYGEN SATURATION: 92 %

## 2023-02-02 PROCEDURE — 99214 OFFICE O/P EST MOD 30 MIN: CPT | Mod: 25

## 2023-02-02 NOTE — HISTORY OF PRESENT ILLNESS
[de-identified] : Mr Young is a very pleasant 78 year old gentleman with HTN, HLD here to discuss management for her right LE DVT\par \par Around beginning of October- he felt as sharp pain righjt calf, no swelling, discomfort\par He went to a local physician - who did ultrasound- sent to [facility]- where he had repeat ultrasoud and was placed on xarelto \par Tolerated well\par \par He had a repeat ultrasound 1/31/19 which showed partially occlusive DVT of the right femoral and polpiteal veins with mild reduction of the venous return\par He was advised to stop xarelto 2/12/19\par \par He feels that past few days - he has started having the same pain right calf pain. No swelling\par \par \par He is retired. He goes to a custodial program 5 days a week, swims 3 days a week\par He always sits cross legged, when not on a recliner\par Denies any long distance travel, surgeries, immobility prior to the event\par \par Has ho prostate cancer had radical prostatectomy [facility] ~ 5 years ago (9/2014).\par No DVT post surgery\par \par No personal or family ho DVT\par \par Colonoscopy (within last year) - Dr Lucien Aguillon - was normal \par \par \par Prothrombin gene mutation:\par HETEROZYGOUS\par INTERPRETATION:\par This patient is heterozygous for the Prothrombin [id number] mutation, and therefore\par at an increased risk(up to 3 fold) for thrombotic complications. However, the\par actual risk may be modified by synergistic interaction with other factors.\par Correlation with this patients clinical condition and/or with results of other\par relevant tests, and genetic counseling is suggested.\par \par Factor V leiden mutation, APS, protein S, AT3 def- negative\par \par Also saw Dr Martinez s/p EGD\par EGD:\par 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 Gastric polyp s/p biopsy\par 3. Irregular Z-line s/p biopsy\par \par Ferritin 80 (5/2020) dropped to 19.8 910/16/20)\par \par He had capsule endoscopy, MRI with Dr Lucien Aguillon (GI) and was told it was normal\par \par He has his blood work done with [facility] (12/14/21)\par No CBC. Ferritin 95 [de-identified] : Garfield is here for follow up and review blood work from few days ago\par s/p Pfizer COVID Vaccine x 3\par \par He co feeling depressed- follows with psych at [facility]- lexapro and abilify dose increased\par Pain on thigh resolved \par He is on prophylactic eliquis for AFib

## 2023-02-02 NOTE — CONSULT LETTER
[Dear  ___] : Dear  [unfilled], [Courtesy Letter:] : I had the pleasure of seeing your patient, [unfilled], in my office today. [Please see my note below.] : Please see my note below. [Consult Closing:] : Thank you very much for allowing me to participate in the care of this patient.  If you have any questions, please do not hesitate to contact me. [Sincerely,] : Sincerely, [DrDariela  ___] : Dr. PRETTY [DrDariela ___] : Dr. PRETTY [FreeTextEntry3] : Rancho Jack MD, MPH\par Attending Physician\par Hematology Oncology\par [facility] [place]\par [facility]\par

## 2023-02-02 NOTE — ASSESSMENT
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 Reassured that it was not the cause of his LE pain\par He is on Eliquis for A. Fib as well\par \par Labs in 5-6 months\par CBC, CMP iron studies, ferritin, erythropoietin, AFP, GGT\par OV few days later

## 2023-02-14 ENCOUNTER — APPOINTMENT (OUTPATIENT)
Dept: ENDOCRINOLOGY | Facility: CLINIC | Age: 83
End: 2023-02-14
Payer: MEDICARE

## 2023-02-14 VITALS
BODY MASS INDEX: 24.9 KG/M2 | WEIGHT: 194 LBS | SYSTOLIC BLOOD PRESSURE: 124 MMHG | HEIGHT: 74 IN | OXYGEN SATURATION: 97 % | HEART RATE: 77 BPM | DIASTOLIC BLOOD PRESSURE: 70 MMHG

## 2023-02-14 DIAGNOSIS — E55.9 VITAMIN D DEFICIENCY, UNSPECIFIED: ICD-10-CM

## 2023-02-14 DIAGNOSIS — Z86.39 PERSONAL HISTORY OF OTHER ENDOCRINE, NUTRITIONAL AND METABOLIC DISEASE: ICD-10-CM

## 2023-02-14 DIAGNOSIS — E53.8 DEFICIENCY OF OTHER SPECIFIED B GROUP VITAMINS: ICD-10-CM

## 2023-02-14 PROCEDURE — 99214 OFFICE O/P EST MOD 30 MIN: CPT | Mod: 25

## 2023-02-14 PROCEDURE — 36415 COLL VENOUS BLD VENIPUNCTURE: CPT

## 2023-02-14 NOTE — HISTORY OF PRESENT ILLNESS
[FreeTextEntry1] : Feb 14, 2023     in person accompanied by his wife\par \par PCP:  Dr. Danny Brewer & Dr. Chou  at [facility]  p \par          Hematology - Dr. Jack    Hx DVT\par          Card:  Dr. Baldemar Montes  - pacemaker\par          Neurology:  Dr. Micaela Black - memory - numbness, tingling \par \par CC:  Low T4, normal TSH  (on Cytomel 25 mcg daily)\par         A1c 7.0\par        (low iron)  \par        (Hx DVT)\par        (memory - Dr. Oneil)\par        (depression)    \par \par 81 yo visits primarily because of history of hypothyroidism   Has positive TPO antibodies.  \par He also has diabetes -  (A1c 7.0%)\par \par Taking \par Cytomel 25 mg daily\par Farxiga 5 mg BID\par metformin 1000 mg daily\par \par Has been depressed \par \par Yesterday BS after beef peri. and potato 2 hour after.  \par Recent A1c 8.0%\par \par He is concerned about numbness and tingling   \par \par : :\par Constitutional:  Alert, well nourished, healthy appearance, no acute distress \par Eyes:  No proptosis, no stare\par Thyroid:\par Pulmonary:  No respiratory distress, no accessory muscle use; normal rate and effort\par Cardiac:  Normal rate\par Vascular: \par Endocrine:  No stigmata of Cushing’s Syndrome\par Musculoskeletal:  Normal gait, no involuntary movements\par Neurology:  No tremors\par Affect/Mood/Psych:  Oriented x 3; normal affect, normal insight/judgement, normal mood \par .\par \par Impression:  No using Contour meter with asistance of a neighbor.\par Oriignal 's but now much better controlled.\par \par Plan: Updated labs today\par Rx for more strips.\par f/u Podiatry for numbness feet.  \par \par \par \par \par \par Jun 28, 2022   in person\par \par PCP:  Dr. Danny Brewer at [facility]  p \par          Hematology - Dr. Jack    Hx DVT\par          Card:  Dr. Baldemar Fass  - pacemaker\par          Neurology:  Dr. Micaela Black - memory - numbness, tingling \par \par CC:  Low T4, normal TSH  (on Cytomel 25 mcg daily)\par         A1c 7.0\par        (low iron)  \par        (Hx DVT)\par        (memory - Dr. Oneil)\par        (depression)    \par \par 81 yo visits primarily because of history of hypothyroidism   Has positive TPO antibodies.  \par He also has diabetes -  (A1c 7.0%)\par \par : :\par Constitutional:  Alert, well nourished, healthy appearance, no acute distress \par Eyes:  No proptosis, no stare\par Thyroid:  normal to palpation\par Pulmonary:  No respiratory distress, no accessory muscle use; normal rate and effort\par Cardiac:  Normal rate\par Vascular: \par Endocrine:  No stigmata of Cushing’s Syndrome\par Musculoskeletal:  Normal gait, no involuntary movements\par Neurology:  No tremors\par Affect/Mood/Psych:  Oriented x 3; normal affect, normal insight/judgement, normal mood \par .\par  Impression:  Taking liothyronine 25 mcg daily and TSH in good range on that dose.\par Recent A1c a few weeks ago remains the same a 7.0%     \par He attributes his success at keeping A1c in good range to activity (usually swims, but since pacemaker he has been using bike)\par and cutting back on carbs and sweets.\par \par Plan:  Check fingerstick BS about once a week and bring records to next visit.\par He already sees ophthalmology once a year and has a podiatrist.\par Continue LT3 25 mcg and ROV in November.  \par Cytomel 25 mcg daily\par Farxiga 5 mg daily *\par atenolol 12.5\par Mirapex 1 mg\par lexapro 20 mg\par Norvasc 7.5 mg\par metformin 100 mg\par \par in PM\par atenolol 12.5 mg\par pirapex 2 mg\par Lipitor   \par \par \par Mar 01, 2022\par \par PCP:  Dr. Danny Brewer at [facility]  p \par          Hematology - Dr. Jack\par \par CC:  Low T4, normal TSH  (on Cytomel 25 mcg daily)\par         A1c 7.0\par        (low iron)  \par        (Hx DVT)\par        (memory - Dr. Oneil)\par        (depression)    \par \par 82 yo on\par Cytomel 25 mcg daily\par Farxiga 5 mg daily *\par atenolol 12.5\par Mirapex 1 mg\par lexapro 20 mg\par Norvasc 7.5 mg\par metformin 100 mg\par \par in PM\par atenolol 12.5 mg\par pirapex 2 mg\par Lipitor   \par \par Recent labs include\par A1c 7.0\par TSH 1.87\par vit D 29.7\par \par T4 2.7 free T4 0.8\par \par : :\par Constitutional:  Alert, well nourished, healthy appearance, no acute distress \par Eyes:  No proptosis, no stare\par Thyroid:\par Pulmonary:  No respiratory distress, no accessory muscle use; normal rate and effort\par Cardiac:  Normal rate\par Vascular: \par Endocrine:  No stigmata of Cushing’s Syndrome\par Musculoskeletal:  Normal gait, no involuntary movements\par Neurology:  No tremors\par Affect/Mood/Psych:  Oriented x 3; normal affect, normal insight/judgement, normal mood \par .\par \par Impression:  Low T4 likely reflects the use of T3 Rx (Cytomel)\par As long as TSH wnl  he can stay on this dose, although may conisder combination of Lt4 and Lt3 in the future\par (e.g., LT3 5 plus LT4 75 mcg daily.   \par \par Plan:  Updated labs today.\par Can consider switch to levothyroxine at 88 mcg daily.\par \par \par \par

## 2023-02-19 LAB
25(OH)D3 SERPL-MCNC: 25 NG/ML
FRUCTOSAMINE SERPL-MCNC: 322 UMOL/L
GLIADIN IGA SER QL: <5 UNITS
GLIADIN IGG SER QL: <5 UNITS
GLIADIN PEPTIDE IGA SER-ACNC: NEGATIVE
GLIADIN PEPTIDE IGG SER-ACNC: NEGATIVE
TSH SERPL-ACNC: 1.57 UIU/ML
TTG IGA SER IA-ACNC: <1.2 U/ML
TTG IGA SER-ACNC: NEGATIVE
TTG IGG SER IA-ACNC: 2.2 U/ML
TTG IGG SER IA-ACNC: NEGATIVE
VIT B12 SERPL-MCNC: 1241 PG/ML

## 2023-03-16 ENCOUNTER — NON-APPOINTMENT (OUTPATIENT)
Age: 83
End: 2023-03-16

## 2023-03-16 ENCOUNTER — APPOINTMENT (OUTPATIENT)
Dept: OPHTHALMOLOGY | Facility: CLINIC | Age: 83
End: 2023-03-16
Payer: MEDICARE

## 2023-03-16 PROCEDURE — 92014 COMPRE OPH EXAM EST PT 1/>: CPT

## 2023-05-09 ENCOUNTER — APPOINTMENT (OUTPATIENT)
Dept: CARDIOLOGY | Facility: CLINIC | Age: 83
End: 2023-05-09
Payer: MEDICARE

## 2023-05-09 ENCOUNTER — NON-APPOINTMENT (OUTPATIENT)
Age: 83
End: 2023-05-09

## 2023-05-09 VITALS
SYSTOLIC BLOOD PRESSURE: 158 MMHG | DIASTOLIC BLOOD PRESSURE: 62 MMHG | HEART RATE: 74 BPM | WEIGHT: 195 LBS | HEIGHT: 74 IN | BODY MASS INDEX: 25.03 KG/M2 | OXYGEN SATURATION: 94 %

## 2023-05-09 PROCEDURE — 93288 INTERROG EVL PM/LDLS PM IP: CPT

## 2023-05-09 PROCEDURE — 93000 ELECTROCARDIOGRAM COMPLETE: CPT | Mod: 59

## 2023-05-09 PROCEDURE — 99214 OFFICE O/P EST MOD 30 MIN: CPT | Mod: 25

## 2023-05-09 NOTE — REASON FOR VISIT
[FreeTextEntry1] : The patient comes for follow-up today\par \par I have followed him for many years with the principal diagnoses of atrial fibrillation, sick sinus syndrome, pacemaker, peripheral neuropathy hypothyroidism and diabetes mellitus.  The patient also has a history of clinical depression\par \par The patient's atrial fibrillation has been paroxysmal.\par

## 2023-05-09 NOTE — CARDIOLOGY SUMMARY
[de-identified] : Stress test November 15, 2022 no clinical evidence of exercise-induced myocardial ischemia good exercise capacity.  Rhythm was atrial fibrillation.

## 2023-05-09 NOTE — HISTORY OF PRESENT ILLNESS
[FreeTextEntry1] : The patient's condition has been stable.  There was a recent laceration of the right leg on stepping out of a swimming pool.  There was also a vasovagal syncopal episode when he was on his feet for 1 hour in front of an audience\par \par He recovered completely from that incident.

## 2023-06-26 ENCOUNTER — APPOINTMENT (OUTPATIENT)
Dept: NEUROLOGY | Facility: CLINIC | Age: 83
End: 2023-06-26

## 2023-07-13 ENCOUNTER — APPOINTMENT (OUTPATIENT)
Dept: CARDIOLOGY | Facility: CLINIC | Age: 83
End: 2023-07-13
Payer: MEDICARE

## 2023-07-13 VITALS — OXYGEN SATURATION: 94 % | DIASTOLIC BLOOD PRESSURE: 80 MMHG | SYSTOLIC BLOOD PRESSURE: 120 MMHG | HEART RATE: 68 BPM

## 2023-07-13 PROCEDURE — 93280 PM DEVICE PROGR EVAL DUAL: CPT

## 2023-07-13 RX ORDER — BLOOD SUGAR DIAGNOSTIC
STRIP MISCELLANEOUS DAILY
Qty: 100 | Refills: 3 | Status: ACTIVE | COMMUNITY
Start: 2023-02-14

## 2023-07-13 RX ORDER — LANCETS
EACH MISCELLANEOUS
Qty: 100 | Refills: 3 | Status: ACTIVE | COMMUNITY
Start: 2023-02-14

## 2023-07-13 RX ORDER — ARIPIPRAZOLE 2 MG/1
2 TABLET ORAL DAILY
Refills: 0 | Status: COMPLETED | COMMUNITY
Start: 2022-02-01 | End: 2023-07-13

## 2023-07-13 NOTE — DISCUSSION/SUMMARY
[Pacemaker Function Normal] : normal pacemaker function [Patient] : the patient [de-identified] : Device interrogation in 6 months

## 2023-07-13 NOTE — PHYSICAL EXAM
[Well Groomed] : well groomed [General Appearance - In No Acute Distress] : no acute distress [Heart Rate And Rhythm] : heart rate and rhythm were normal [Heart Sounds] : normal S1 and S2 [] : no respiratory distress [Respiration, Rhythm And Depth] : normal respiratory rhythm and effort

## 2023-07-13 NOTE — REVIEW OF SYSTEMS
[SOB] : no shortness of breath [Dyspnea on exertion] : not dyspnea during exertion [Chest Discomfort] : no chest discomfort [Palpitations] : no palpitations [Syncope] : no syncope

## 2023-07-13 NOTE — HISTORY OF PRESENT ILLNESS
[de-identified] : S/p Medtronic dual chamber pacemaker for sick sinus syndrome with long pauses on 6/13/22, atrial fibrillation on Eliquis, hypertension, hyperlipidemia, DM typ2, history of DVT, hypothyroidism, iron deficiency anemia. \par \par He denies chest pain, SOB, palpitations, dizziness or syncope.\par \par

## 2023-07-13 NOTE — PROCEDURE
[No] : not [NSR] : normal sinus rhythm [Pacemaker] : pacemaker [Medtronic] : Medtronic [Threshold Testing Performed] : Threshold testing was performed [None] : none [Counters Reset] : the counters were reset [de-identified] : Angelique DE SANTIAGO DR MRI [id number] [de-identified] : [id number] [de-identified] : 6/13/22 [de-identified] : AAIR<=>DDDR [de-identified] : 60/130 [de-identified] : 11.7 years [de-identified] : Normal device function. AT/AF burden 0.6%.

## 2023-08-03 ENCOUNTER — RESULT REVIEW (OUTPATIENT)
Age: 83
End: 2023-08-03

## 2023-08-03 ENCOUNTER — APPOINTMENT (OUTPATIENT)
Dept: HEMATOLOGY ONCOLOGY | Facility: CLINIC | Age: 83
End: 2023-08-03

## 2023-08-03 VITALS
SYSTOLIC BLOOD PRESSURE: 129 MMHG | HEART RATE: 71 BPM | BODY MASS INDEX: 26.08 KG/M2 | TEMPERATURE: 97.1 F | WEIGHT: 203.19 LBS | RESPIRATION RATE: 16 BRPM | OXYGEN SATURATION: 94 % | HEIGHT: 74 IN | DIASTOLIC BLOOD PRESSURE: 67 MMHG

## 2023-08-10 ENCOUNTER — RESULT REVIEW (OUTPATIENT)
Age: 83
End: 2023-08-10

## 2023-08-10 ENCOUNTER — APPOINTMENT (OUTPATIENT)
Dept: HEMATOLOGY ONCOLOGY | Facility: CLINIC | Age: 83
End: 2023-08-10
Payer: MEDICARE

## 2023-08-10 VITALS
RESPIRATION RATE: 17 BRPM | HEIGHT: 74 IN | SYSTOLIC BLOOD PRESSURE: 121 MMHG | HEART RATE: 81 BPM | WEIGHT: 201.56 LBS | TEMPERATURE: 97.4 F | DIASTOLIC BLOOD PRESSURE: 55 MMHG | BODY MASS INDEX: 25.87 KG/M2 | OXYGEN SATURATION: 98 %

## 2023-08-10 DIAGNOSIS — Z00.00 ENCOUNTER FOR GENERAL ADULT MEDICAL EXAMINATION W/OUT ABNORMAL FINDINGS: ICD-10-CM

## 2023-08-10 DIAGNOSIS — D75.1 SECONDARY POLYCYTHEMIA: ICD-10-CM

## 2023-08-10 DIAGNOSIS — D50.9 IRON DEFICIENCY ANEMIA, UNSPECIFIED: ICD-10-CM

## 2023-08-10 PROCEDURE — 36415 COLL VENOUS BLD VENIPUNCTURE: CPT

## 2023-08-10 PROCEDURE — 99214 OFFICE O/P EST MOD 30 MIN: CPT | Mod: 25

## 2023-08-10 NOTE — ASSESSMENT
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 Denies of any bleeding. Explained to patient and his wife that despite his heterozygous hemochromatosis and hx of erythrocytosis, he'll benefit from 1 dose of Venofer 200 mg given his drastic decreased in Hgb/Ferritin and fatigue.  He should also follow up with GI to r/o bleed from Eliquis.   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   # Right LE DVT Heterozygous prothrombin gene mutation Likely unprovoked He reports that he had an extensive occlusive right LE DVT- started having symptoms again since stopping xarelto Completed 6 months of AC (xarelto) in April 2019 Keep uptodate with cancer screening Repeat Doppler shows chronic DVT. Reassured that it was not the cause of his LE pain He is on Eliquis for A. Fib as well  rtc in 6 weeks for repeat labs after Venofer, he'll call to go over result To follow up in 5-6 months. CBC, CMP iron studies, ferritin, erythropoietin, AFP, GGT OV few days later

## 2023-08-10 NOTE — CONSULT LETTER
[Dear  ___] : Dear  [unfilled], [Courtesy Letter:] : I had the pleasure of seeing your patient, [unfilled], in my office today. [Please see my note below.] : Please see my note below. [Consult Closing:] : Thank you very much for allowing me to participate in the care of this patient.  If you have any questions, please do not hesitate to contact me. [Sincerely,] : Sincerely, [DrDariela  ___] : Dr. PRETTY [DrDariela ___] : Dr. PRETTY [FreeTextEntry3] : Rancho Jack MD, MPH\par  Attending Physician\par  Hematology Oncology\par  [facility] [place]\par  [facility]\par

## 2023-08-10 NOTE — HISTORY OF PRESENT ILLNESS
[de-identified] : Mr Young is a very pleasant 78 year old gentleman with HTN, HLD here to discuss management for her right LE DVT\par  \par  Around beginning of October- he felt as sharp pain righjt calf, no swelling, discomfort\par  He went to a local physician - who did ultrasound- sent to [facility]- where he had repeat ultrasoud and was placed on xarelto \par  Tolerated well\par  \par  He had a repeat ultrasound 1/31/19 which showed partially occlusive DVT of the right femoral and polpiteal veins with mild reduction of the venous return\par  He was advised to stop xarelto 2/12/19\par  \par  He feels that past few days - he has started having the same pain right calf pain. No swelling\par  \par  \par  He is retired. He goes to a [employer] program 5 days a week, swims 3 days a week\par  He always sits cross legged, when not on a recliner\par  Denies any long distance travel, surgeries, immobility prior to the event\par  \par  Has ho prostate cancer had radical prostatectomy [facility] ~ 5 years ago (9/2014).\par  No DVT post surgery\par  \par  No personal or family ho DVT\par  \par  Colonoscopy (within last year) - Dr Lucien Aguillon - was normal \par  \par  \par  Prothrombin gene mutation:\par  HETEROZYGOUS\par  INTERPRETATION:\par  This patient is heterozygous for the Prothrombin [id number] mutation, and therefore\par  at an increased risk(up to 3 fold) for thrombotic complications. However, the\par  actual risk may be modified by synergistic interaction with other factors.\par  Correlation with this patients clinical condition and/or with results of other\par  relevant tests, and genetic counseling is suggested.\par  \par  Factor V leiden mutation, APS, protein S, AT3 def- negative\par  \par  Also saw Dr Martinez s/p EGD\par  EGD:\par  1. 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 Gastric polyp s/p biopsy\par  3. Irregular Z-line s/p biopsy\par  \par  Ferritin 80 (5/2020) dropped to 19.8 910/16/20)\par  \par  He had capsule endoscopy, MRI with Dr Lucien Aguillon (GI) and was told it was normal\par  \par  He has his blood work done with [facility] (12/14/21)\par  No CBC. Ferritin 95 [de-identified] : Garfield is here for follow up and review blood work from few days ago s/p Pfizer COVID Vaccine x 3\  In May, he bumped his right knee getting out of the swimming pool - self treated with topical ABX. A few weeks later, he developed a rash and swelling from knee down to ankle on same leg, given PO ABX.  He has more fatigue than usual and not swimming as much.   He is on prophylactic eliquis for AFib

## 2023-08-15 ENCOUNTER — APPOINTMENT (OUTPATIENT)
Dept: ENDOCRINOLOGY | Facility: CLINIC | Age: 83
End: 2023-08-15
Payer: MEDICARE

## 2023-08-15 VITALS
SYSTOLIC BLOOD PRESSURE: 124 MMHG | BODY MASS INDEX: 25.41 KG/M2 | OXYGEN SATURATION: 95 % | HEIGHT: 74 IN | DIASTOLIC BLOOD PRESSURE: 70 MMHG | HEART RATE: 71 BPM | WEIGHT: 198 LBS

## 2023-08-15 PROCEDURE — 99214 OFFICE O/P EST MOD 30 MIN: CPT

## 2023-08-15 RX ORDER — ESCITALOPRAM OXALATE 20 MG/1
20 TABLET ORAL
Refills: 0 | Status: DISCONTINUED | COMMUNITY
End: 2023-08-15

## 2023-08-15 RX ORDER — REPAGLINIDE 0.5 MG/1
0.5 TABLET ORAL
Qty: 90 | Refills: 1 | Status: ACTIVE | COMMUNITY
Start: 2023-08-15 | End: 1900-01-01

## 2023-08-15 NOTE — HISTORY OF PRESENT ILLNESS
[FreeTextEntry1] : Aug 15, 2023    in person accompanied by his wife  PCP:  Dr. Danny Brewer & Dr. Chou  at [facility]  p           Hematology - Dr. Jack    Hx DVT          Card:  Dr. Baldemar Montes  - pacemaker          Neurology:  Dr. Micaela Black - memory - numbness, tingling   CC:  Low T4, normal TSH  (on Cytomel 25 mcg daily)         A1c 7.0        (low iron)          (Hx DVT)        (memory - Dr. Oneil)        (depression)      83 yo visits primarily because of history of hypothyroidism   Has positive TPO antibodies.   He also has diabetes -  (A1c 7.0%)  Now taking Cytomel 25 mcg in AM Farxiga 5 mg Atenolol 12.5 BID Pramipexole 1 mg in am and 2 mg in PM Lexapro 30 mg Norvasc  7 1/2 mg daily Eliquis 2 1/2 BID metformin 000 mg Lipitor 10 mg in PM glimepiride 2 mg in AM, but stopped.      Careuly monitoring sugars and they are generally in good range. On trip to [facility], developed infection RLE after trauma.   He gets sharp pain on R side which he atributes to a nerve dying.    : : Constitutional:  Alert, well nourished, healthy appearance, no acute distress  Eyes:  No proptosis, no stare Thyroid: Pulmonary:  No respiratory distress, no accessory muscle use; normal rate and effort Cardiac:  Normal rate Vascular:  Endocrine:  No stigmata of Cushings Syndrome Musculoskeletal:  Normal gait, no involuntary movements Neurology:  No tremors Affect/Mood/Psych:  Oriented x 3; normal affect, normal insight/judgement, normal mood  . ImpressioN:   doing an excellent job. Has some PC sugars in mid-200s.  8/10/23 A1c 7.3   Plan:  Can add Prandin  0.5 mg AC breakfast to start ROV May   Feb 14, 20 23     in person accompanied by his wife  PCP:  Dr. Danny Brewer & Dr. Chou  at [facility]  p           Hematology - Dr. Jack    Hx DVT          Card:  Dr. Baldemar Montes  - pacemaker          Neurology:  Dr. Micaela Black - memory - numbness, tingling   CC:  Low T4, normal TSH  (on Cytomel 25 mcg daily)         A1c 7.0        (low iron)          (Hx DVT)        (memory - Dr. Oneil)        (depression)      83 yo visits primarily because of history of hypothyroidism   Has positive TPO antibodies.   He also has diabetes -  (A1c 7.0%)  Taking  Cytomel 25 mg daily Farxiga 5 mg BID metformin 1000 mg daily  Has been depressed   Yesterday BS after beef peri. and potato 2 hour after.   Recent A1c 8.0%  He is concerned about numbness and tingling     : : Constitutional:  Alert, well nourished, healthy appearance, no acute distress  Eyes:  No proptosis, no stare Thyroid: Pulmonary:  No respiratory distress, no accessory muscle use; normal rate and effort Cardiac:  Normal rate Vascular:  Endocrine:  No stigmata of Cushing's Syndrome Musculoskeletal:  Normal gait, no involuntary movements Neurology:  No tremors Affect/Mood/Psych:  Oriented x 3; normal affect, normal insight/judgement, normal mood  .  Impression:  No using Contour meter with asistance of a neighbor. Oriignal 's but now much better controlled.  Plan: Updated labs today Rx for more strips. f/u Podiatry for numbness feet.        Jun 28, 2022   in person  PCP:  Dr. Danny Brewer at [facility]  p           Hematology - Dr. Jack    Hx DVT          Card:  Dr. Baldemar Montes  - pacemaker          Neurology:  Dr. Micaela Black - memory - numbness, tingling   CC:  Low T4, normal TSH  (on Cytomel 25 mcg daily)         A1c 7.0        (low iron)          (Hx DVT)        (memory - Dr. Oneil)        (depression)      83 yo visits primarily because of history of hypothyroidism   Has positive TPO antibodies.   He also has diabetes -  (A1c 7.0%)  : : Constitutional:  Alert, well nourished, healthy appearance, no acute distress  Eyes:  No proptosis, no stare Thyroid:  normal to palpation Pulmonary:  No respiratory distress, no accessory muscle use; normal rate and effort Cardiac:  Normal rate Vascular:  Endocrine:  No stigmata of Cushing's Syndrome Musculoskeletal:  Normal gait, no involuntary movements Neurology:  No tremors Affect/Mood/Psych:  Oriented x 3; normal affect, normal insight/judgement, normal mood  .  Impression:  Taking liothyronine 25 mcg daily and TSH in good range on that dose. Recent A1c a few weeks ago remains the same a 7.0%      He attributes his success at keeping A1c in good range to activity (usually swims, but since pacemaker he has been using bike) and cutting back on carbs and sweets.  Plan:  Check fingerstick BS about once a week and bring records to next visit. He already sees ophthalmology once a year and has a podiatrist. Continue LT3 25 mcg and ROV in November.   Cytomel 25 mcg daily Farxiga 5 mg daily * atenolol 12.5 Mirapex 1 mg lexapro 20 mg Norvasc 7.5 mg metformin 100 mg  in PM atenolol 12.5 mg pirapex 2 mg Lipitor      Mar 01, 2022  PCP:  Dr. Danny Brewer at [facility]  p           Hematology - Dr. Jack  CC:  Low T4, normal TSH  (on Cytomel 25 mcg daily)         A1c 7.0        (low iron)          (Hx DVT)        (memory - Dr. Oneil)        (depression)      82 yo on Cytomel 25 mcg daily Farxiga 5 mg daily * atenolol 12.5 Mirapex 1 mg lexapro 20 mg Norvasc 7.5 mg metformin 100 mg  in PM atenolol 12.5 mg pirapex 2 mg Lipitor     Recent labs include A1c 7.0 TSH 1.87 vit D 29.7  T4 2.7 free T4 0.8  : : Constitutional:  Alert, well nourished, healthy appearance, no acute distress  Eyes:  No proptosis, no stare Thyroid: Pulmonary:  No respiratory distress, no accessory muscle use; normal rate and effort Cardiac:  Normal rate Vascular:  Endocrine:  No stigmata of Cushing's Syndrome Musculoskeletal:  Normal gait, no involuntary movements Neurology:  No tremors Affect/Mood/Psych:  Oriented x 3; normal affect, normal insight/judgement, normal mood  .  Impression:  Low T4 likely reflects the use of T3 Rx (Cytomel) As long as TSH wnl  he can stay on this dose, although may conisder combination of Lt4 and Lt3 in the future (e.g., LT3 5 plus LT4 75 mcg daily.     Plan:  Updated labs today. Can consider switch to levothyroxine at 88 mcg daily.

## 2023-08-23 ENCOUNTER — APPOINTMENT (OUTPATIENT)
Dept: NEUROLOGY | Facility: CLINIC | Age: 83
End: 2023-08-23
Payer: MEDICARE

## 2023-08-23 VITALS
DIASTOLIC BLOOD PRESSURE: 64 MMHG | BODY MASS INDEX: 25.41 KG/M2 | HEART RATE: 83 BPM | HEIGHT: 74 IN | SYSTOLIC BLOOD PRESSURE: 149 MMHG | WEIGHT: 198 LBS

## 2023-08-23 DIAGNOSIS — R41.9 UNSPECIFIED SYMPTOMS AND SIGNS INVOLVING COGNITIVE FUNCTIONS AND AWARENESS: ICD-10-CM

## 2023-08-23 DIAGNOSIS — R41.3 OTHER AMNESIA: ICD-10-CM

## 2023-08-23 DIAGNOSIS — G62.9 POLYNEUROPATHY, UNSPECIFIED: ICD-10-CM

## 2023-08-23 DIAGNOSIS — G25.81 RESTLESS LEGS SYNDROME: ICD-10-CM

## 2023-08-23 PROCEDURE — 99214 OFFICE O/P EST MOD 30 MIN: CPT

## 2023-08-23 RX ORDER — GLIMEPIRIDE 2 MG/1
2 TABLET ORAL
Refills: 0 | Status: DISCONTINUED | COMMUNITY
End: 2023-08-23

## 2023-08-23 RX ORDER — QUINIDINE SULFATE 200 MG
TABLET ORAL DAILY
Refills: 0 | Status: DISCONTINUED | COMMUNITY
Start: 2020-10-08 | End: 2023-08-23

## 2023-08-23 RX ORDER — ARIPIPRAZOLE 2 MG/1
2 TABLET ORAL
Refills: 0 | Status: DISCONTINUED | COMMUNITY
End: 2023-08-23

## 2023-08-23 NOTE — HISTORY OF PRESENT ILLNESS
[FreeTextEntry1] : 8/23/23 Emigdio is here in follow-up.  He is accompanied to this visit by his wife. He has no pain in his extremities.  He has mild tingling in his feet.  Patient states that his memory is also stable.  His wife corroborates this.  He continues to take care of the finances at home.  He is driving without any issues.  Short-term memory can be affected more than long-term memory.  But his wife and the patient notes that this short-term memory can be worse when he is stressed out or depressed.  He does have a history of this.  He has good insight into this.  Mrs. Young reports that overall he is doing well and has a good social support network and keeps up with his friends and continues to be witty.  He has no significant personality changes.  He is stressed out currently because he is going to be getting surgery for the right Dupuytren's contracture.  Pacemaker one year ago  Tremor stable.  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 He is establishing care with me.  He notes tingling under arm pits and right sided tingling in his feet. No loss of balance no loss of strength- swims frequently.  Mirapex was decreased from 4 to 3 tab psychopharmacologist reduced it This is to help with depression apparently. depression - ECT -   A1C 7  Memory complaints are stable.   11/29/21- HPI from Dr. Cruz and Skye Rogers NP History obtained from patient and wife.  Emigdio Young is a 81 year old man with a history of type 2 diabetes, DVT, RBBB, dyslipidemia, presenting for a follow up appointment to review his EMG results.    He has not had any decline in memory since his last visit. He did not complete the MRI or neuropsychological evaluation previously ordered. As per his wife, he is able to function but occasionally will forget some names.   He continues to have intermediate sharp pain in his right medial thigh. He will have occasional numbness in his lower legs and feet. Denies any worsening of symptoms or weakness. He is monitoring his glucose with his PCP.  The remaining neurological review of systems is negative.   8/26/21  This is a 81-year-old man with several months of pain.  He has 1 very localized sharp pain in the medial thigh.  There is tenderness there.  He also has pain in the medial foot in the arch which sometimes radiates to the big toe.  He has occasional feeling of numbness in both feet.  He is not sure how long this has been.  He never had any low back pain and no radiating pain in the legs.  He has no urinary problems.  He has problems with constipation. The pain is better if he is moving around.  There is tenderness in the medial thigh and medial foot. He has had diabetes mellitus for 1 to 2 years.  He has Dupuytren's contracture, bilaterally. He has a history of alcohol abuse and has been a member of Alcoholics Anonymous for 45 years.  He has not drank alcohol for decades. He has a history of depression, he has had ECT in the distant past.  He says that he has slight short-term memory problems but he feels this is due to impaired attention.  He is able to perform all of his IADLs.

## 2023-08-23 NOTE — END OF VISIT
[Time Spent: ___ minutes] : I have spent [unfilled] minutes of time on the encounter. [FreeTextEntry3] : I was present with the Nurse Practitioner during the key portions of the history and exam. I agree with the findings and plan as documented in the Nurse Practitioner's note, unless noted below.\par  Attesting Faculty: See Attending Electronic Signature Below\par  \par

## 2023-08-23 NOTE — ASSESSMENT
[FreeTextEntry1] : This is a 81-year-old man with multiple issues:  For diabetic neuropathy- recommended tight control of glucose. No pain.  Encouraged exercise.  RLS symptoms under good control. Interestingly he is using  Premipexole for psychiatric reasons but notes it helps RLS.  Memory changes- stable- At present monitor. Based on wife and patient's report symptoms more consistent with stress and anxiety rather than neurodegenerative dysfunction.   rtc prn.

## 2023-08-23 NOTE — DATA REVIEWED
[de-identified] : 11/17/21\par  EMG legs-there is electrophysiologic evidence of an axonal sensorimotor polyneuropathy involving the legs with a possible demyelinating component. There is no electrophysiologic evidence of a right lumbosacral radiculopathy involving the motor axons.

## 2023-08-23 NOTE — CONSULT LETTER
[Dear  ___] : Dear  [unfilled], [Courtesy Letter:] : I had the pleasure of seeing your patient, [unfilled], in my office today. [Please see my note below.] : Please see my note below. [Consult Closing:] : Thank you very much for allowing me to participate in the care of this patient.  If you have any questions, please do not hesitate to contact me. [DrDariela  ___] : Dr. PRETTY [FreeTextEntry3] : Sincerely,\par  \par  Micaela Black M.D.\par

## 2023-08-23 NOTE — PHYSICAL EXAM
[FreeTextEntry1] : Physical examination  General: No acute distress, Awake, Alert.     Mental status  Awake, alert, oriented. normal language and concentration. normal insight.  Cranial Nerves  II: VFF   III, IV, VI: PERRL, EOMI.    V: Facial sensation is normal B/L.    VII: Facial strength is normal B/L.  VIII: Decreased hearing, bilaterally.  IX, X: Palate is midline and elevates symmetrically.    XI: Trapezius normal strength.    XII: Tongue midline without atrophy or fasciculations.   Motor exam   Muscle tone -mild cogwheel rigidity in both arms. Tremor with action and posture: High-frequency, low amplitude..   No atrophy or fasciculations  Muscle Strength: arms and legs, proximal and distal flexors and extensors are normal  No UE drift.  Reflexes  All present, normal, and symmetrical  Plantars right: mute.    Plantars left: mute.     Coordination  Slow, no ataxia, tremor with action- FNF, DONALD, HKS.   Sensory  Decreased sensation to MM B.  Stocking distribution decreased sensation to pinprick longterm up the lower leg.  Reduced distal sensation to cold and vibration.   Gait  He is able to walk on his heels and toes and do a few steps with tandem gait.   Slow, wide based gait.

## 2023-09-12 ENCOUNTER — APPOINTMENT (OUTPATIENT)
Dept: HEMATOLOGY ONCOLOGY | Facility: CLINIC | Age: 83
End: 2023-09-12

## 2023-09-13 ENCOUNTER — NON-APPOINTMENT (OUTPATIENT)
Age: 83
End: 2023-09-13

## 2023-09-13 ENCOUNTER — APPOINTMENT (OUTPATIENT)
Dept: CARDIOLOGY | Facility: CLINIC | Age: 83
End: 2023-09-13
Payer: MEDICARE

## 2023-09-13 VITALS
HEART RATE: 78 BPM | OXYGEN SATURATION: 96 % | BODY MASS INDEX: 24.95 KG/M2 | SYSTOLIC BLOOD PRESSURE: 134 MMHG | HEIGHT: 74 IN | TEMPERATURE: 97.6 F | DIASTOLIC BLOOD PRESSURE: 70 MMHG | RESPIRATION RATE: 18 BRPM | WEIGHT: 194.44 LBS

## 2023-09-13 DIAGNOSIS — E11.9 TYPE 2 DIABETES MELLITUS W/OUT COMPLICATIONS: ICD-10-CM

## 2023-09-13 DIAGNOSIS — I45.10 UNSPECIFIED RIGHT BUNDLE-BRANCH BLOCK: ICD-10-CM

## 2023-09-13 PROCEDURE — 93000 ELECTROCARDIOGRAM COMPLETE: CPT | Mod: XU

## 2023-09-13 PROCEDURE — 93288 INTERROG EVL PM/LDLS PM IP: CPT

## 2023-09-13 PROCEDURE — 99214 OFFICE O/P EST MOD 30 MIN: CPT

## 2023-09-14 ENCOUNTER — APPOINTMENT (OUTPATIENT)
Dept: OPHTHALMOLOGY | Facility: CLINIC | Age: 83
End: 2023-09-14
Payer: MEDICARE

## 2023-09-14 ENCOUNTER — NON-APPOINTMENT (OUTPATIENT)
Age: 83
End: 2023-09-14

## 2023-09-14 PROCEDURE — 92014 COMPRE OPH EXAM EST PT 1/>: CPT

## 2023-11-14 ENCOUNTER — APPOINTMENT (OUTPATIENT)
Dept: ENDOCRINOLOGY | Facility: CLINIC | Age: 83
End: 2023-11-14

## 2023-11-21 ENCOUNTER — APPOINTMENT (OUTPATIENT)
Dept: CARDIOLOGY | Facility: CLINIC | Age: 83
End: 2023-11-21
Payer: MEDICARE

## 2023-11-21 ENCOUNTER — NON-APPOINTMENT (OUTPATIENT)
Age: 83
End: 2023-11-21

## 2023-11-21 VITALS
SYSTOLIC BLOOD PRESSURE: 136 MMHG | DIASTOLIC BLOOD PRESSURE: 70 MMHG | OXYGEN SATURATION: 97 % | WEIGHT: 197 LBS | HEART RATE: 91 BPM | BODY MASS INDEX: 25.29 KG/M2

## 2023-11-21 DIAGNOSIS — E78.5 HYPERLIPIDEMIA, UNSPECIFIED: ICD-10-CM

## 2023-11-21 DIAGNOSIS — I49.5 SICK SINUS SYNDROME: ICD-10-CM

## 2023-11-21 DIAGNOSIS — Z86.718 PERSONAL HISTORY OF OTHER VENOUS THROMBOSIS AND EMBOLISM: ICD-10-CM

## 2023-11-21 PROCEDURE — 99214 OFFICE O/P EST MOD 30 MIN: CPT | Mod: 25

## 2023-11-21 PROCEDURE — 93288 INTERROG EVL PM/LDLS PM IP: CPT

## 2023-11-21 PROCEDURE — 93000 ELECTROCARDIOGRAM COMPLETE: CPT | Mod: 59

## 2023-11-21 RX ORDER — APIXABAN 2.5 MG/1
2.5 TABLET, FILM COATED ORAL
Qty: 180 | Refills: 3 | Status: ACTIVE | COMMUNITY
Start: 2022-06-17 | End: 1900-01-01

## 2024-01-08 ENCOUNTER — RESULT REVIEW (OUTPATIENT)
Age: 84
End: 2024-01-08

## 2024-01-08 ENCOUNTER — APPOINTMENT (OUTPATIENT)
Dept: HEMATOLOGY ONCOLOGY | Facility: CLINIC | Age: 84
End: 2024-01-08

## 2024-01-08 VITALS
BODY MASS INDEX: 24.57 KG/M2 | DIASTOLIC BLOOD PRESSURE: 60 MMHG | TEMPERATURE: 97.1 F | HEART RATE: 74 BPM | SYSTOLIC BLOOD PRESSURE: 135 MMHG | WEIGHT: 191.44 LBS | HEIGHT: 74 IN | RESPIRATION RATE: 16 BRPM | OXYGEN SATURATION: 93 %

## 2024-01-11 ENCOUNTER — APPOINTMENT (OUTPATIENT)
Dept: CARDIOLOGY | Facility: CLINIC | Age: 84
End: 2024-01-11
Payer: MEDICARE

## 2024-01-11 ENCOUNTER — APPOINTMENT (OUTPATIENT)
Dept: HEMATOLOGY ONCOLOGY | Facility: CLINIC | Age: 84
End: 2024-01-11
Payer: MEDICARE

## 2024-01-11 VITALS
SYSTOLIC BLOOD PRESSURE: 132 MMHG | HEART RATE: 76 BPM | BODY MASS INDEX: 26.9 KG/M2 | TEMPERATURE: 97 F | RESPIRATION RATE: 16 BRPM | OXYGEN SATURATION: 94 % | DIASTOLIC BLOOD PRESSURE: 61 MMHG | WEIGHT: 198.56 LBS | HEIGHT: 72 IN

## 2024-01-11 DIAGNOSIS — D68.52 PROTHROMBIN GENE MUTATION: ICD-10-CM

## 2024-01-11 DIAGNOSIS — Z95.0 PRESENCE OF CARDIAC PACEMAKER: ICD-10-CM

## 2024-01-11 DIAGNOSIS — I48.91 UNSPECIFIED ATRIAL FIBRILLATION: ICD-10-CM

## 2024-01-11 PROCEDURE — 99214 OFFICE O/P EST MOD 30 MIN: CPT | Mod: 25

## 2024-01-11 PROCEDURE — 93280 PM DEVICE PROGR EVAL DUAL: CPT

## 2024-01-11 RX ORDER — LIOTHYRONINE SODIUM 25 UG/1
25 TABLET ORAL
Refills: 0 | Status: COMPLETED | COMMUNITY
End: 2024-01-11

## 2024-01-11 RX ORDER — ESCITALOPRAM OXALATE 5 MG/1
TABLET, FILM COATED ORAL DAILY
Refills: 0 | Status: ACTIVE | COMMUNITY

## 2024-01-11 NOTE — HISTORY OF PRESENT ILLNESS
[de-identified] : Mr Young is a very pleasant 78 year old gentleman with HTN, HLD here to discuss management for her right LE DVT\par  \par  Around beginning of October- he felt as sharp pain righjt calf, no swelling, discomfort\par  He went to a local physician - who did ultrasound- sent to [facility]- where he had repeat ultrasoud and was placed on xarelto \par  Tolerated well\par  \par  He had a repeat ultrasound 1/31/19 which showed partially occlusive DVT of the right femoral and polpiteal veins with mild reduction of the venous return\par  He was advised to stop xarelto 2/12/19\par  \par  He feels that past few days - he has started having the same pain right calf pain. No swelling\par  \par  \par  He is retired. 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 However, the\par  actual risk may be modified by synergistic interaction with other factors.\par  Correlation with this patients clinical condition and/or with results of other\par  relevant tests, and genetic counseling is suggested.\par  \par  Factor V leiden mutation, APS, protein S, AT3 def- negative\par  \par  Also saw Dr Martinez s/p EGD\par  EGD:\par  1. 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 Gastric polyp s/p biopsy\par  3. Irregular Z-line s/p biopsy\par  \par  Ferritin 80 (5/2020) dropped to 19.8 910/16/20)\par  \par  He had capsule endoscopy, MRI with Dr Lucien Aguillon (GI) and was told it was normal\par  \par  He has his blood work done with [facility] (12/14/21)\par  No CBC. Ferritin 95 [de-identified] : Garfield is here for follow up and review blood work from few days ago s/p Pfizer COVID Vaccine x 3  He got Venofer 200  mg x 1 in August 2023  He was seen by GI Dr. Lucien Montaño a few weeks for constipation which has improved with Miralaax. Last GI work up was in 2019 with Dr. Martinez for anemia.  Patient reports of having fatigue but still remains active with biking a few miles a day.  He is on prophylactic eliquis for AFib

## 2024-01-11 NOTE — PROCEDURE
[No] : not [NSR] : normal sinus rhythm [Pacemaker] : pacemaker [Medtronic] : Medtronic [Threshold Testing Performed] : Threshold testing was performed [None] : none [Counters Reset] : the counters were reset [de-identified] : Angelique DE SANTIAGO DR MRI [id number] [de-identified] : [id number] [de-identified] : 6/13/22 [de-identified] : AAIR<=>DDDR [de-identified] : 60/130 [de-identified] : 10.9 years [de-identified] : Normal device function. AT/AF burden 2.9%

## 2024-01-11 NOTE — HISTORY OF PRESENT ILLNESS
[de-identified] : S/p Medtronic dual chamber pacemaker for sick sinus syndrome with long pauses on 6/13/22, atrial fibrillation on Eliquis, hypertension, hyperlipidemia, DM typ2, history of DVT, hypothyroidism, iron deficiency anemia. \par  \par  He denies chest pain, SOB, palpitations, dizziness or syncope.\par  \par

## 2024-01-11 NOTE — CONSULT LETTER
[Dear  ___] : Dear  [unfilled], [Courtesy Letter:] : I had the pleasure of seeing your patient, [unfilled], in my office today. [Please see my note below.] : Please see my note below. [Consult Closing:] : Thank you very much for allowing me to participate in the care of this patient.  If you have any questions, please do not hesitate to contact me. [Sincerely,] : Sincerely, [DrDariela  ___] : Dr. PRETTY [DrDariela ___] : Dr. PRETTY [FreeTextEntry3] : Rancho Jack MD, MPH\par  Attending Physician\par  Hematology Oncology\par  [facility] [place]\par  [facility]\par

## 2024-01-11 NOTE — ASSESSMENT
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  He's clinically doing well, has some fatigue but remains active -Labs reviewed, analyzed, and discussed Hgb stable but Ferritin remains low at 15 with 'dark black' stools - advised to follow up with his GI Dr. Lucien Montaño - given his ongoing anemia, recommends just 1 dose of Venofer 200 mg and repeat labs in 6 weeks.   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  # Right LE DVT Heterozygous prothrombin gene mutation Likely unprovoked He reports that he had an extensive occlusive right LE DVT- started having symptoms again since stopping xarelto Completed 6 months of AC (xarelto) in April 2019 Keep uptodate with cancer screening Repeat Doppler shows chronic DVT. Reassured that it was not the cause of his LE pain He is on Eliquis for A. Fib as well  rtc in 6 weeks for repeat labs after Venofer, he'll call to go over result To follow up in 3-4 months CBC, CMP iron studies, ferritin, erythropoietin, AFP, GGT OV few days later.

## 2024-01-11 NOTE — DISCUSSION/SUMMARY
[Pacemaker Function Normal] : normal pacemaker function [Patient] : the patient [de-identified] : Continue remote monitoring. Device interrogation in 6 months

## 2024-02-22 ENCOUNTER — RESULT REVIEW (OUTPATIENT)
Age: 84
End: 2024-02-22

## 2024-02-22 ENCOUNTER — APPOINTMENT (OUTPATIENT)
Dept: HEMATOLOGY ONCOLOGY | Facility: CLINIC | Age: 84
End: 2024-02-22

## 2024-02-22 VITALS
HEART RATE: 86 BPM | HEIGHT: 72 IN | OXYGEN SATURATION: 96 % | SYSTOLIC BLOOD PRESSURE: 115 MMHG | TEMPERATURE: 97.1 F | DIASTOLIC BLOOD PRESSURE: 55 MMHG | RESPIRATION RATE: 17 BRPM | WEIGHT: 196.8 LBS | BODY MASS INDEX: 26.66 KG/M2

## 2024-03-01 ENCOUNTER — APPOINTMENT (OUTPATIENT)
Dept: ENDOCRINOLOGY | Facility: CLINIC | Age: 84
End: 2024-03-01
Payer: MEDICARE

## 2024-03-01 VITALS
WEIGHT: 194 LBS | BODY MASS INDEX: 26.28 KG/M2 | HEART RATE: 90 BPM | DIASTOLIC BLOOD PRESSURE: 78 MMHG | OXYGEN SATURATION: 97 % | HEIGHT: 72 IN | SYSTOLIC BLOOD PRESSURE: 116 MMHG

## 2024-03-01 DIAGNOSIS — E03.9 HYPOTHYROIDISM, UNSPECIFIED: ICD-10-CM

## 2024-03-01 PROCEDURE — 99215 OFFICE O/P EST HI 40 MIN: CPT

## 2024-03-01 PROCEDURE — 36415 COLL VENOUS BLD VENIPUNCTURE: CPT

## 2024-03-01 PROCEDURE — G2211 COMPLEX E/M VISIT ADD ON: CPT

## 2024-03-01 NOTE — HISTORY OF PRESENT ILLNESS
[FreeTextEntry1] : Mar 01, 2024     in person w/ his wife  PCP:  Dr. Danny Brewer & Dr. Chou  at [facility]  p           Hematology - Dr. Jack    Hx DVT          Card:  Dr. Erin Montes  - pacemaker- 40+ yrs ago            Neurology:  Dr. Micaela Black - memory** - numbness, tingling       remote Hx EtOH XS   CC:  Low T4, normal TSH  (on Cytomel 25 mcg daily)  + TPO AB 70 (<35)           A1c:  8/2023  7.3;     2/2024   7.4        (low iron)          (Hx DVT)        (memory - Dr. Oneil)-> 2024  [facility] program on [address] in [place]:  "[facility]".   Dr. Hamilton -a                         neuropsychologist;  and will see neurologist Dr. Yates                                                                  (depression)      84 yo visits primarily because of history of hypothyroidism   Has positive TPO antibodies.   He also has diabetes -  recent A1c 7.4 % - on metformin 1000 mg every AM     Recent fingerstick BS 1 1/2 hr after food:  232 mg/dl     Now taking Cytomel 25 mcg in AM Farxiga 5 mg metformin 1000 mg once a day glimepiride 2 mg - STOPPED      Atenolol 12.5 BID Pramipexole 1 mg in am and 2 mg in PM Lexapro 30 mg Norvasc  7 1/2 mg daily Eliquis 2 1/2 BID Lipitor 10 mg in PM   Carefully  monitoring sugars  Goes to [employer] , swimming, but still depressed.         : : Constitutional:  Alert, well nourished, healthy appearance, no acute distress  Eyes:  No proptosis, no stare Thyroid: Pulmonary:  No respiratory distress, no accessory muscle use; normal rate and effort Cardiac:  Normal rate Vascular:  Endocrine:  No stigmata of Cushings Syndrome Musculoskeletal:  Normal gait, no involuntary movements Neurology:  No tremors Affect/Mood/Psych:  Oriented x 3; normal affect, normal insight/judgement, normal mood  .  Impression:  A1c in acceptable range per ACP guidelines for his age. Likely the LT3 is part of protocol for depression (his wife thinks he may miss some doses)  Plan:  TFTs today.        Aug 15, 2023    in person accompanied by his wife  PCP:  Dr. Danny Brewer & Dr. Chou  at [facility]  p           Hematology - Dr. Jack    Hx DVT          Card:  Dr. Baldemar Montes  - pacemaker          Neurology:  Dr. Micaela Black - memory - numbness, tingling   CC:  Low T4, normal TSH  (on Cytomel 25 mcg daily)         A1c 7.0        (low iron)          (Hx DVT)        (memory - Dr. Oneil)        (depression)      83 yo visits primarily because of history of hypothyroidism   Has positive TPO antibodies.   He also has diabetes -  (A1c 7.0%)  Now taking Cytomel 25 mcg in AM Farxiga 5 mg Atenolol 12.5 BID Pramipexole 1 mg in am and 2 mg in PM Lexapro 30 mg Norvasc  7 1/2 mg daily Eliquis 2 1/2 BID metformin 000 mg Lipitor 10 mg in PM glimepiride 2 mg in AM, but stopped.      Careuly monitoring sugars and they are generally in good range. On trip to [facility], developed infection RLE after trauma.   He gets sharp pain on R side which he atributes to a nerve dying.    : : Constitutional:  Alert, well nourished, healthy appearance, no acute distress  Eyes:  No proptosis, no stare Thyroid: Pulmonary:  No respiratory distress, no accessory muscle use; normal rate and effort Cardiac:  Normal rate Vascular:  Endocrine:  No stigmata of Cushings Syndrome Musculoskeletal:  Normal gait, no involuntary movements Neurology:  No tremors Affect/Mood/Psych:  Oriented x 3; normal affect, normal insight/judgement, normal mood  . ImpressioN:   doing an excellent job. Has some PC sugars in mid-200s.  8/10/23 A1c 7.3   Plan:  Can add Prandin  0.5 mg AC breakfast to start ROV May   Feb 14, 20 23     in person accompanied by his wife  PCP:  Dr. Danny Brewer & Dr. Chou  at [facility]  p           Hematology - Dr. Jack    Hx DVT          Card:  Dr. Baldemar Montes  - pacemaker          Neurology:  Dr. Micaela Black - memory - numbness, tingling   CC:  Low T4, normal TSH  (on Cytomel 25 mcg daily)         A1c 7.0        (low iron)          (Hx DVT)        (memory - Dr. Oneil)        (depression)      83 yo visits primarily because of history of hypothyroidism   Has positive TPO antibodies.   He also has diabetes -  (A1c 7.0%)  Taking  Cytomel 25 mg daily Farxiga 5 mg BID metformin 1000 mg daily  Has been depressed   Yesterday BS after beef peri. and potato 2 hour after.   Recent A1c 8.0%  He is concerned about numbness and tingling     : : Constitutional:  Alert, well nourished, healthy appearance, no acute distress  Eyes:  No proptosis, no stare Thyroid: Pulmonary:  No respiratory distress, no accessory muscle use; normal rate and effort Cardiac:  Normal rate Vascular:  Endocrine:  No stigmata of Cushing's Syndrome Musculoskeletal:  Normal gait, no involuntary movements Neurology:  No tremors Affect/Mood/Psych:  Oriented x 3; normal affect, normal insight/judgement, normal mood  .  Impression:  No using Contour meter with asistance of a neighbor. Oriignal 's but now much better controlled.  Plan: Updated labs today Rx for more strips. f/u Podiatry for numbness feet.        Jun 28, 2022   in person  PCP:  Dr. Danny Brewer at [facility]  p           Hematology - Dr. Jack    Hx DVT          Card:  Dr. Baldemar Montes  - pacemaker          Neurology:  Dr. Micaela Black - memory - numbness, tingling   CC:  Low T4, normal TSH  (on Cytomel 25 mcg daily)         A1c 7.0        (low iron)          (Hx DVT)        (memory - Dr. Oneil)        (depression)      83 yo visits primarily because of history of hypothyroidism   Has positive TPO antibodies.   He also has diabetes -  (A1c 7.0%)  : : Constitutional:  Alert, well nourished, healthy appearance, no acute distress  Eyes:  No proptosis, no stare Thyroid:  normal to palpation Pulmonary:  No respiratory distress, no accessory muscle use; normal rate and effort Cardiac:  Normal rate Vascular:  Endocrine:  No stigmata of Cushing's Syndrome Musculoskeletal:  Normal gait, no involuntary movements Neurology:  No tremors Affect/Mood/Psych:  Oriented x 3; normal affect, normal insight/judgement, normal mood  .  Impression:  Taking liothyronine 25 mcg daily and TSH in good range on that dose. Recent A1c a few weeks ago remains the same a 7.0%      He attributes his success at keeping A1c in good range to activity (usually swims, but since pacemaker he has been using bike) and cutting back on carbs and sweets.  Plan:  Check fingerstick BS about once a week and bring records to next visit. He already sees ophthalmology once a year and has a podiatrist. Continue LT3 25 mcg and ROV in November.   Cytomel 25 mcg daily Farxiga 5 mg daily * atenolol 12.5 Mirapex 1 mg lexapro 20 mg Norvasc 7.5 mg metformin 100 mg  in PM atenolol 12.5 mg pirapex 2 mg Lipitor      Mar 01, 2022  PCP:  Dr. Danny Brewer at [facility]  p           Hematology - Dr. Jack  CC:  Low T4, normal TSH  (on Cytomel 25 mcg daily)         A1c 7.0        (low iron)          (Hx DVT)        (memory - Dr. Oneil)        (depression)      82 yo on Cytomel 25 mcg daily Farxiga 5 mg daily * atenolol 12.5 Mirapex 1 mg lexapro 20 mg Norvasc 7.5 mg metformin 100 mg  in PM atenolol 12.5 mg pirapex 2 mg Lipitor     Recent labs include A1c 7.0 TSH 1.87 vit D 29.7  T4 2.7 free T4 0.8  : : Constitutional:  Alert, well nourished, healthy appearance, no acute distress  Eyes:  No proptosis, no stare Thyroid: Pulmonary:  No respiratory distress, no accessory muscle use; normal rate and effort Cardiac:  Normal rate Vascular:  Endocrine:  No stigmata of Cushing's Syndrome Musculoskeletal:  Normal gait, no involuntary movements Neurology:  No tremors Affect/Mood/Psych:  Oriented x 3; normal affect, normal insight/judgement, normal mood  .  Impression:  Low T4 likely reflects the use of T3 Rx (Cytomel) As long as TSH wnl  he can stay on this dose, although may conisder combination of Lt4 and Lt3 in the future (e.g., LT3 5 plus LT4 75 mcg daily.     Plan:  Updated labs today. Can consider switch to levothyroxine at 88 mcg daily.

## 2024-03-11 LAB
LH SERPL-ACNC: 4 IU/L
T3 SERPL-MCNC: 92 NG/DL
T4 SERPL-MCNC: 3.3 UG/DL
TSH SERPL-ACNC: 1.36 UIU/ML

## 2024-03-22 ENCOUNTER — APPOINTMENT (OUTPATIENT)
Dept: ENDOCRINOLOGY | Facility: CLINIC | Age: 84
End: 2024-03-22

## 2024-04-04 ENCOUNTER — APPOINTMENT (OUTPATIENT)
Dept: HEMATOLOGY ONCOLOGY | Facility: CLINIC | Age: 84
End: 2024-04-04

## 2024-04-04 ENCOUNTER — RESULT REVIEW (OUTPATIENT)
Age: 84
End: 2024-04-04

## 2024-04-04 VITALS
DIASTOLIC BLOOD PRESSURE: 63 MMHG | RESPIRATION RATE: 16 BRPM | HEART RATE: 87 BPM | OXYGEN SATURATION: 98 % | BODY MASS INDEX: 26.61 KG/M2 | HEIGHT: 72 IN | TEMPERATURE: 96.8 F | WEIGHT: 196.44 LBS | SYSTOLIC BLOOD PRESSURE: 115 MMHG

## 2024-04-11 ENCOUNTER — APPOINTMENT (OUTPATIENT)
Dept: HEMATOLOGY ONCOLOGY | Facility: CLINIC | Age: 84
End: 2024-04-11
Payer: MEDICARE

## 2024-04-11 VITALS
RESPIRATION RATE: 16 BRPM | HEIGHT: 72 IN | WEIGHT: 198.4 LBS | TEMPERATURE: 97.5 F | DIASTOLIC BLOOD PRESSURE: 65 MMHG | SYSTOLIC BLOOD PRESSURE: 131 MMHG | HEART RATE: 79 BPM | BODY MASS INDEX: 26.87 KG/M2 | OXYGEN SATURATION: 92 %

## 2024-04-11 DIAGNOSIS — E83.119 HEMOCHROMATOSIS, UNSPECIFIED: ICD-10-CM

## 2024-04-11 DIAGNOSIS — D64.9 ANEMIA, UNSPECIFIED: ICD-10-CM

## 2024-04-11 PROCEDURE — 99214 OFFICE O/P EST MOD 30 MIN: CPT

## 2024-04-11 PROCEDURE — G2211 COMPLEX E/M VISIT ADD ON: CPT

## 2024-04-11 NOTE — HISTORY OF PRESENT ILLNESS
[de-identified] : Mr Young is a very pleasant 78 year old gentleman with HTN, HLD here to discuss management for her right LE DVT\par  \par  Around beginning of October- he felt as sharp pain righjt calf, no swelling, discomfort\par  He went to a local physician - who did ultrasound- sent to [facility]- where he had repeat ultrasoud and was placed on xarelto \par  Tolerated well\par  \par  He had a repeat ultrasound 1/31/19 which showed partially occlusive DVT of the right femoral and polpiteal veins with mild reduction of the venous return\par  He was advised to stop xarelto 2/12/19\par  \par  He feels that past few days - he has started having the same pain right calf pain. No swelling\par  \par  \par  He is retired. He goes to a intermediate program 5 days a week, swims 3 days a week\par  He always sits cross legged, when not on a recliner\par  Denies any long distance travel, surgeries, immobility prior to the event\par  \par  Has ho prostate cancer had radical prostatectomy [facility] ~ 5 years ago (9/2014).\par  No DVT post surgery\par  \par  No personal or family ho DVT\par  \par  Colonoscopy (within last year) - Dr Lucien Aguillon - was normal \par  \par  \par  Prothrombin gene mutation:\par  HETEROZYGOUS\par  INTERPRETATION:\par  This patient is heterozygous for the Prothrombin [id number] mutation, and therefore\par  at an increased risk(up to 3 fold) for thrombotic complications. However, the\par  actual risk may be modified by synergistic interaction with other factors.\par  Correlation with this patients clinical condition and/or with results of other\par  relevant tests, and genetic counseling is suggested.\par  \par  Factor V leiden mutation, APS, protein S, AT3 def- negative\par  \par  Also saw Dr Martinez s/p EGD\par  EGD:\par  1. 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 Gastric polyp s/p biopsy\par  3. Irregular Z-line s/p biopsy\par  \par  Ferritin 80 (5/2020) dropped to 19.8 910/16/20)\par  \par  He had capsule endoscopy, MRI with Dr Lucien Aguillon (GI) and was told it was normal\par  \par  He has his blood work done with [facility] (12/14/21)\par  No CBC. Ferritin 95 [de-identified] : Garfield is here for follow up and review blood work from few days ago s/p Pfizer COVID Vaccine x 3  He got Venofer 200  mg x 1 in August 2023 and Jan 2024  Patient fell and scrape his right forearm earlier this morning - minor bleeding.  Seen by his GI Dr. Lucien Montaño for hx of constipation.  Now back to swimming a few times a week. He is on prophylactic eliquis for AFib

## 2024-04-11 NOTE — PHYSICAL EXAM
[Ambulatory and capable of all self care but unable to carry out any work activities] : Status 2- Ambulatory and capable of all self care but unable to carry out any work activities. Up and about more than 50% of waking hours [Normal] : affect appropriate [de-identified] : +small open skin (scraping) on right forearm

## 2024-04-11 NOTE — CONSULT LETTER
[Dear  ___] : Dear  [unfilled], [Courtesy Letter:] : I had the pleasure of seeing your patient, [unfilled], in my office today. [Please see my note below.] : Please see my note below. [Consult Closing:] : Thank you very much for allowing me to participate in the care of this patient.  If you have any questions, please do not hesitate to contact me. [Sincerely,] : Sincerely, [DrDariela  ___] : Dr. PRETTY [DrDariela ___] : Dr. PRETTY [FreeTextEntry3] : Rancho Jack MD, MPH\par  Attending Physician\par  Hematology Oncology\par  [facility] [place]\par  [facility]\par

## 2024-04-11 NOTE — ASSESSMENT
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  s/p Venofer 200 mg x 1 in August 2023.  s/p Venofer 200 mg x 1 in March 2023 due to Ferritin and decreasing Hgb He's clinically doing well, has some fatigue and back to swimming a few times a week.  -Labs reviewed, analyzed, and discussed Hgb improved and Ferritin remains at 12.  Advised that we can hold off on iron infusion and for him to start on iron tablet for now. To repeat labs every 6 wks and follow up with GI Dr. Lucien Montaño.   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   # Right LE DVT Heterozygous prothrombin gene mutation Likely unprovoked He reports that he had an extensive occlusive right LE DVT- started having symptoms again since stopping xarelto Completed 6 months of AC (xarelto) in April 2019 Keep uptodate with cancer screening Repeat Doppler shows chronic DVT. Reassured that it was not the cause of his LE pain He is on Eliquis for A. Fib as well  rtc in 6 weeks for repeat labs locally (she'll call to over result) and 3 months for follow up with Dr. Jack  To follow up in 3-4 months CBC, CMP iron studies, ferritin, erythropoietin, AFP, GGT OV few days later.

## 2024-04-26 ENCOUNTER — APPOINTMENT (OUTPATIENT)
Dept: OPHTHALMOLOGY | Facility: CLINIC | Age: 84
End: 2024-04-26
Payer: MEDICARE

## 2024-04-26 ENCOUNTER — NON-APPOINTMENT (OUTPATIENT)
Age: 84
End: 2024-04-26

## 2024-04-26 PROCEDURE — 92012 INTRM OPH EXAM EST PATIENT: CPT

## 2024-05-20 ENCOUNTER — NON-APPOINTMENT (OUTPATIENT)
Age: 84
End: 2024-05-20

## 2024-05-20 ENCOUNTER — APPOINTMENT (OUTPATIENT)
Dept: OPHTHALMOLOGY | Facility: CLINIC | Age: 84
End: 2024-05-20

## 2024-05-20 ENCOUNTER — OUTPATIENT (OUTPATIENT)
Dept: OUTPATIENT SERVICES | Facility: HOSPITAL | Age: 84
LOS: 1 days | End: 2024-05-20
Payer: MEDICARE

## 2024-05-20 PROCEDURE — 66821 AFTER CATARACT LASER SURGERY: CPT | Mod: LT

## 2024-05-24 DIAGNOSIS — H26.492 OTHER SECONDARY CATARACT, LEFT EYE: ICD-10-CM

## 2024-06-06 ENCOUNTER — NON-APPOINTMENT (OUTPATIENT)
Age: 84
End: 2024-06-06

## 2024-06-06 ENCOUNTER — APPOINTMENT (OUTPATIENT)
Dept: OPHTHALMOLOGY | Facility: CLINIC | Age: 84
End: 2024-06-06
Payer: MEDICARE

## 2024-06-06 PROCEDURE — 99024 POSTOP FOLLOW-UP VISIT: CPT

## 2024-07-10 DIAGNOSIS — D75.1 SECONDARY POLYCYTHEMIA: ICD-10-CM

## 2024-07-10 DIAGNOSIS — D64.9 ANEMIA, UNSPECIFIED: ICD-10-CM

## 2024-07-10 DIAGNOSIS — E83.119 HEMOCHROMATOSIS, UNSPECIFIED: ICD-10-CM

## 2024-07-11 ENCOUNTER — RESULT REVIEW (OUTPATIENT)
Age: 84
End: 2024-07-11

## 2024-07-11 ENCOUNTER — APPOINTMENT (OUTPATIENT)
Dept: CARDIOLOGY | Facility: CLINIC | Age: 84
End: 2024-07-11
Payer: MEDICARE

## 2024-07-11 ENCOUNTER — APPOINTMENT (OUTPATIENT)
Dept: HEMATOLOGY ONCOLOGY | Facility: CLINIC | Age: 84
End: 2024-07-11

## 2024-07-11 VITALS
SYSTOLIC BLOOD PRESSURE: 125 MMHG | TEMPERATURE: 97.8 F | HEART RATE: 73 BPM | BODY MASS INDEX: 26.01 KG/M2 | RESPIRATION RATE: 16 BRPM | HEIGHT: 72 IN | DIASTOLIC BLOOD PRESSURE: 65 MMHG | WEIGHT: 192 LBS | OXYGEN SATURATION: 92 %

## 2024-07-11 DIAGNOSIS — I48.91 UNSPECIFIED ATRIAL FIBRILLATION: ICD-10-CM

## 2024-07-11 DIAGNOSIS — I49.5 SICK SINUS SYNDROME: ICD-10-CM

## 2024-07-11 DIAGNOSIS — Z95.0 PRESENCE OF CARDIAC PACEMAKER: ICD-10-CM

## 2024-07-11 PROCEDURE — 93283 PRGRMG EVAL IMPLANTABLE DFB: CPT

## 2024-07-18 ENCOUNTER — APPOINTMENT (OUTPATIENT)
Dept: HEMATOLOGY ONCOLOGY | Facility: CLINIC | Age: 84
End: 2024-07-18
Payer: MEDICARE

## 2024-07-18 VITALS
WEIGHT: 191.06 LBS | BODY MASS INDEX: 25.88 KG/M2 | HEART RATE: 80 BPM | HEIGHT: 72 IN | SYSTOLIC BLOOD PRESSURE: 131 MMHG | TEMPERATURE: 97.3 F | DIASTOLIC BLOOD PRESSURE: 63 MMHG | RESPIRATION RATE: 16 BRPM | OXYGEN SATURATION: 94 %

## 2024-07-18 DIAGNOSIS — D50.9 IRON DEFICIENCY ANEMIA, UNSPECIFIED: ICD-10-CM

## 2024-07-18 DIAGNOSIS — D68.52 PROTHROMBIN GENE MUTATION: ICD-10-CM

## 2024-07-18 DIAGNOSIS — R53.83 OTHER FATIGUE: ICD-10-CM

## 2024-07-18 PROCEDURE — 99214 OFFICE O/P EST MOD 30 MIN: CPT | Mod: 25

## 2024-07-18 PROCEDURE — G2211 COMPLEX E/M VISIT ADD ON: CPT

## 2024-07-18 RX ORDER — FERROUS SULFATE 137(45) MG
45 TABLET, EXTENDED RELEASE ORAL
Qty: 30 | Refills: 0 | Status: ACTIVE | COMMUNITY
Start: 2024-07-18 | End: 1900-01-01

## 2024-10-25 ENCOUNTER — APPOINTMENT (OUTPATIENT)
Dept: OPHTHALMOLOGY | Facility: CLINIC | Age: 84
End: 2024-10-25

## 2024-11-06 ENCOUNTER — APPOINTMENT (OUTPATIENT)
Dept: HEMATOLOGY ONCOLOGY | Facility: CLINIC | Age: 84
End: 2024-11-06

## 2024-11-13 ENCOUNTER — RESULT REVIEW (OUTPATIENT)
Age: 84
End: 2024-11-13

## 2024-11-13 ENCOUNTER — APPOINTMENT (OUTPATIENT)
Dept: HEMATOLOGY ONCOLOGY | Facility: CLINIC | Age: 84
End: 2024-11-13
Payer: MEDICARE

## 2024-11-13 DIAGNOSIS — D50.9 IRON DEFICIENCY ANEMIA, UNSPECIFIED: ICD-10-CM

## 2024-11-13 DIAGNOSIS — D68.52 PROTHROMBIN GENE MUTATION: ICD-10-CM

## 2024-11-13 DIAGNOSIS — E83.119 HEMOCHROMATOSIS, UNSPECIFIED: ICD-10-CM

## 2024-11-13 DIAGNOSIS — G25.81 RESTLESS LEGS SYNDROME: ICD-10-CM

## 2024-11-13 PROCEDURE — 99214 OFFICE O/P EST MOD 30 MIN: CPT

## 2024-11-13 PROCEDURE — G2211 COMPLEX E/M VISIT ADD ON: CPT

## 2025-01-09 ENCOUNTER — APPOINTMENT (OUTPATIENT)
Dept: CARDIOLOGY | Facility: CLINIC | Age: 85
End: 2025-01-09
Payer: MEDICARE

## 2025-01-09 VITALS — SYSTOLIC BLOOD PRESSURE: 110 MMHG | OXYGEN SATURATION: 96 % | DIASTOLIC BLOOD PRESSURE: 60 MMHG | HEART RATE: 80 BPM

## 2025-01-09 DIAGNOSIS — Z95.0 PRESENCE OF CARDIAC PACEMAKER: ICD-10-CM

## 2025-01-09 PROCEDURE — 93283 PRGRMG EVAL IMPLANTABLE DFB: CPT

## 2025-02-19 ENCOUNTER — RESULT REVIEW (OUTPATIENT)
Age: 85
End: 2025-02-19

## 2025-02-19 ENCOUNTER — APPOINTMENT (OUTPATIENT)
Dept: HEMATOLOGY ONCOLOGY | Facility: CLINIC | Age: 85
End: 2025-02-19
Payer: MEDICARE

## 2025-02-19 VITALS
RESPIRATION RATE: 16 BRPM | SYSTOLIC BLOOD PRESSURE: 139 MMHG | WEIGHT: 190.5 LBS | HEART RATE: 68 BPM | OXYGEN SATURATION: 94 % | HEIGHT: 72 IN | BODY MASS INDEX: 25.8 KG/M2 | TEMPERATURE: 97.4 F | DIASTOLIC BLOOD PRESSURE: 73 MMHG

## 2025-02-19 DIAGNOSIS — Z86.718 PERSONAL HISTORY OF OTHER VENOUS THROMBOSIS AND EMBOLISM: ICD-10-CM

## 2025-02-19 PROCEDURE — 36415 COLL VENOUS BLD VENIPUNCTURE: CPT

## 2025-02-19 PROCEDURE — 99214 OFFICE O/P EST MOD 30 MIN: CPT

## 2025-02-19 PROCEDURE — G2211 COMPLEX E/M VISIT ADD ON: CPT

## 2025-02-20 DIAGNOSIS — E83.119 HEMOCHROMATOSIS, UNSPECIFIED: ICD-10-CM

## 2025-02-20 DIAGNOSIS — D68.52 PROTHROMBIN GENE MUTATION: ICD-10-CM

## 2025-05-07 ENCOUNTER — RESULT REVIEW (OUTPATIENT)
Age: 85
End: 2025-05-07

## 2025-05-07 ENCOUNTER — APPOINTMENT (OUTPATIENT)
Dept: HEMATOLOGY ONCOLOGY | Facility: CLINIC | Age: 85
End: 2025-05-07

## 2025-05-07 VITALS
BODY MASS INDEX: 26.07 KG/M2 | WEIGHT: 192.5 LBS | OXYGEN SATURATION: 94 % | TEMPERATURE: 97.2 F | RESPIRATION RATE: 16 BRPM | HEIGHT: 72 IN | DIASTOLIC BLOOD PRESSURE: 60 MMHG | HEART RATE: 71 BPM | SYSTOLIC BLOOD PRESSURE: 121 MMHG

## 2025-05-14 ENCOUNTER — APPOINTMENT (OUTPATIENT)
Dept: HEMATOLOGY ONCOLOGY | Facility: CLINIC | Age: 85
End: 2025-05-14
Payer: MEDICARE

## 2025-05-14 VITALS
WEIGHT: 193.25 LBS | TEMPERATURE: 98.3 F | OXYGEN SATURATION: 96 % | SYSTOLIC BLOOD PRESSURE: 136 MMHG | HEART RATE: 76 BPM | DIASTOLIC BLOOD PRESSURE: 63 MMHG | BODY MASS INDEX: 26.18 KG/M2 | RESPIRATION RATE: 16 BRPM | HEIGHT: 72 IN

## 2025-05-14 DIAGNOSIS — D68.52 PROTHROMBIN GENE MUTATION: ICD-10-CM

## 2025-05-14 DIAGNOSIS — E83.119 HEMOCHROMATOSIS, UNSPECIFIED: ICD-10-CM

## 2025-05-14 PROCEDURE — 99214 OFFICE O/P EST MOD 30 MIN: CPT

## 2025-05-14 PROCEDURE — G2211 COMPLEX E/M VISIT ADD ON: CPT

## 2025-05-14 RX ORDER — FLUTICASONE FUROATE, UMECLIDINIUM BROMIDE AND VILANTEROL TRIFENATATE 200; 62.5; 25 UG/1; UG/1; UG/1
POWDER RESPIRATORY (INHALATION)
Refills: 0 | Status: ACTIVE | COMMUNITY

## 2025-06-05 ENCOUNTER — APPOINTMENT (OUTPATIENT)
Dept: OPHTHALMOLOGY | Facility: CLINIC | Age: 85
End: 2025-06-05
Payer: MEDICARE

## 2025-06-05 ENCOUNTER — NON-APPOINTMENT (OUTPATIENT)
Age: 85
End: 2025-06-05

## 2025-06-05 PROCEDURE — 92134 CPTRZ OPH DX IMG PST SGM RTA: CPT

## 2025-06-05 PROCEDURE — 92014 COMPRE OPH EXAM EST PT 1/>: CPT

## 2025-09-03 ENCOUNTER — APPOINTMENT (OUTPATIENT)
Dept: HEMATOLOGY ONCOLOGY | Facility: CLINIC | Age: 85
End: 2025-09-03

## 2025-09-10 ENCOUNTER — APPOINTMENT (OUTPATIENT)
Dept: HEMATOLOGY ONCOLOGY | Facility: CLINIC | Age: 85
End: 2025-09-10